# Patient Record
Sex: MALE | Race: WHITE
[De-identification: names, ages, dates, MRNs, and addresses within clinical notes are randomized per-mention and may not be internally consistent; named-entity substitution may affect disease eponyms.]

---

## 2023-07-24 ENCOUNTER — RX ONLY (RX ONLY)
Age: 58
End: 2023-07-24

## 2023-07-24 ENCOUNTER — OFFICE (OUTPATIENT)
Dept: URBAN - METROPOLITAN AREA CLINIC 109 | Facility: CLINIC | Age: 58
Setting detail: OPHTHALMOLOGY
End: 2023-07-24
Payer: COMMERCIAL

## 2023-07-24 DIAGNOSIS — H35.54: ICD-10-CM

## 2023-07-24 DIAGNOSIS — H43.393: ICD-10-CM

## 2023-07-24 DIAGNOSIS — H16.223: ICD-10-CM

## 2023-07-24 DIAGNOSIS — H25.13: ICD-10-CM

## 2023-07-24 PROCEDURE — 92014 COMPRE OPH EXAM EST PT 1/>: CPT | Performed by: OPHTHALMOLOGY

## 2023-07-24 PROCEDURE — 92250 FUNDUS PHOTOGRAPHY W/I&R: CPT | Performed by: OPHTHALMOLOGY

## 2023-07-24 ASSESSMENT — REFRACTION_AUTOREFRACTION
OD_SPHERE: -0.50
OS_SPHERE: 0.00
OS_CYLINDER: -0.50
OD_CYLINDER: -0.25
OD_AXIS: 003
OS_AXIS: 150

## 2023-07-24 ASSESSMENT — CONFRONTATIONAL VISUAL FIELD TEST (CVF)
OD_FINDINGS: FULL
OS_FINDINGS: FULL

## 2023-07-24 ASSESSMENT — SUPERFICIAL PUNCTATE KERATITIS (SPK)
OD_SPK: 1+
OS_SPK: 1+

## 2023-07-24 ASSESSMENT — SPHEQUIV_DERIVED
OS_SPHEQUIV: -0.25
OD_SPHEQUIV: -0.625

## 2023-07-24 ASSESSMENT — VISUAL ACUITY
OD_BCVA: 20/20-1
OS_BCVA: 20/20

## 2023-07-24 ASSESSMENT — TONOMETRY
OS_IOP_MMHG: 16
OD_IOP_MMHG: 16

## 2024-07-30 ENCOUNTER — OFFICE (OUTPATIENT)
Dept: URBAN - METROPOLITAN AREA CLINIC 109 | Facility: CLINIC | Age: 59
Setting detail: OPHTHALMOLOGY
End: 2024-07-30
Payer: COMMERCIAL

## 2024-07-30 DIAGNOSIS — H35.54: ICD-10-CM

## 2024-07-30 DIAGNOSIS — H43.393: ICD-10-CM

## 2024-07-30 DIAGNOSIS — H25.13: ICD-10-CM

## 2024-07-30 DIAGNOSIS — H00.015: ICD-10-CM

## 2024-07-30 DIAGNOSIS — H16.223: ICD-10-CM

## 2024-07-30 PROCEDURE — 92014 COMPRE OPH EXAM EST PT 1/>: CPT | Performed by: OPHTHALMOLOGY

## 2024-07-30 PROCEDURE — 92250 FUNDUS PHOTOGRAPHY W/I&R: CPT | Performed by: OPHTHALMOLOGY

## 2024-07-30 ASSESSMENT — CONFRONTATIONAL VISUAL FIELD TEST (CVF)
OS_FINDINGS: FULL
OD_FINDINGS: FULL

## 2024-11-24 ENCOUNTER — INPATIENT (INPATIENT)
Facility: HOSPITAL | Age: 59
LOS: 1 days | Discharge: HOME | DRG: 305 | End: 2024-11-26
Attending: STUDENT IN AN ORGANIZED HEALTH CARE EDUCATION/TRAINING PROGRAM | Admitting: INTERNAL MEDICINE
Payer: COMMERCIAL

## 2024-11-24 VITALS
HEART RATE: 83 BPM | RESPIRATION RATE: 18 BRPM | TEMPERATURE: 98 F | SYSTOLIC BLOOD PRESSURE: 236 MMHG | HEIGHT: 74 IN | DIASTOLIC BLOOD PRESSURE: 134 MMHG | OXYGEN SATURATION: 98 % | WEIGHT: 240.08 LBS

## 2024-11-24 PROCEDURE — 99285 EMERGENCY DEPT VISIT HI MDM: CPT

## 2024-11-24 PROCEDURE — 93010 ELECTROCARDIOGRAM REPORT: CPT

## 2024-11-24 NOTE — ED ADULT TRIAGE NOTE - CHIEF COMPLAINT QUOTE
constipation, last bowel movement was Thursday.  pt hypertensive with feeling of being light headed and bilateral weak legs

## 2024-11-25 DIAGNOSIS — I16.0 HYPERTENSIVE URGENCY: ICD-10-CM

## 2024-11-25 DIAGNOSIS — I72.8 ANEURYSM OF OTHER SPECIFIED ARTERIES: ICD-10-CM

## 2024-11-25 DIAGNOSIS — Z29.9 ENCOUNTER FOR PROPHYLACTIC MEASURES, UNSPECIFIED: ICD-10-CM

## 2024-11-25 DIAGNOSIS — K59.00 CONSTIPATION, UNSPECIFIED: ICD-10-CM

## 2024-11-25 PROBLEM — N20.0 CALCULUS OF KIDNEY: Chronic | Status: ACTIVE | Noted: 2017-03-11

## 2024-11-25 LAB
ALBUMIN SERPL ELPH-MCNC: 4.1 G/DL — SIGNIFICANT CHANGE UP (ref 3.3–5)
ALP SERPL-CCNC: 72 U/L — SIGNIFICANT CHANGE UP (ref 40–120)
ALT FLD-CCNC: 38 U/L — SIGNIFICANT CHANGE UP (ref 12–78)
ANION GAP SERPL CALC-SCNC: 6 MMOL/L — SIGNIFICANT CHANGE UP (ref 5–17)
AST SERPL-CCNC: 20 U/L — SIGNIFICANT CHANGE UP (ref 15–37)
BASOPHILS # BLD AUTO: 0.04 K/UL — SIGNIFICANT CHANGE UP (ref 0–0.2)
BASOPHILS NFR BLD AUTO: 0.4 % — SIGNIFICANT CHANGE UP (ref 0–2)
BILIRUB SERPL-MCNC: 0.9 MG/DL — SIGNIFICANT CHANGE UP (ref 0.2–1.2)
BUN SERPL-MCNC: 20 MG/DL — SIGNIFICANT CHANGE UP (ref 7–23)
CALCIUM SERPL-MCNC: 9.1 MG/DL — SIGNIFICANT CHANGE UP (ref 8.5–10.1)
CHLORIDE SERPL-SCNC: 108 MMOL/L — SIGNIFICANT CHANGE UP (ref 96–108)
CK MB CFR SERPL CALC: 2.1 NG/ML — SIGNIFICANT CHANGE UP (ref 0–3.6)
CO2 SERPL-SCNC: 28 MMOL/L — SIGNIFICANT CHANGE UP (ref 22–31)
CREAT SERPL-MCNC: 0.84 MG/DL — SIGNIFICANT CHANGE UP (ref 0.5–1.3)
EGFR: 99 ML/MIN/1.73M2 — SIGNIFICANT CHANGE UP
EOSINOPHIL # BLD AUTO: 0.11 K/UL — SIGNIFICANT CHANGE UP (ref 0–0.5)
EOSINOPHIL NFR BLD AUTO: 1.2 % — SIGNIFICANT CHANGE UP (ref 0–6)
GLUCOSE SERPL-MCNC: 92 MG/DL — SIGNIFICANT CHANGE UP (ref 70–99)
HCT VFR BLD CALC: 45.6 % — SIGNIFICANT CHANGE UP (ref 39–50)
HGB BLD-MCNC: 15.4 G/DL — SIGNIFICANT CHANGE UP (ref 13–17)
IMM GRANULOCYTES NFR BLD AUTO: 0.1 % — SIGNIFICANT CHANGE UP (ref 0–0.9)
LIDOCAIN IGE QN: 26 U/L — SIGNIFICANT CHANGE UP (ref 13–75)
LYMPHOCYTES # BLD AUTO: 2.31 K/UL — SIGNIFICANT CHANGE UP (ref 1–3.3)
LYMPHOCYTES # BLD AUTO: 24.9 % — SIGNIFICANT CHANGE UP (ref 13–44)
MCHC RBC-ENTMCNC: 28.8 PG — SIGNIFICANT CHANGE UP (ref 27–34)
MCHC RBC-ENTMCNC: 33.8 G/DL — SIGNIFICANT CHANGE UP (ref 32–36)
MCV RBC AUTO: 85.2 FL — SIGNIFICANT CHANGE UP (ref 80–100)
MONOCYTES # BLD AUTO: 0.67 K/UL — SIGNIFICANT CHANGE UP (ref 0–0.9)
MONOCYTES NFR BLD AUTO: 7.2 % — SIGNIFICANT CHANGE UP (ref 2–14)
NEUTROPHILS # BLD AUTO: 6.12 K/UL — SIGNIFICANT CHANGE UP (ref 1.8–7.4)
NEUTROPHILS NFR BLD AUTO: 66.2 % — SIGNIFICANT CHANGE UP (ref 43–77)
NRBC # BLD: 0 /100 WBCS — SIGNIFICANT CHANGE UP (ref 0–0)
PLATELET # BLD AUTO: 234 K/UL — SIGNIFICANT CHANGE UP (ref 150–400)
POTASSIUM SERPL-MCNC: 3.5 MMOL/L — SIGNIFICANT CHANGE UP (ref 3.5–5.3)
POTASSIUM SERPL-SCNC: 3.5 MMOL/L — SIGNIFICANT CHANGE UP (ref 3.5–5.3)
PROT SERPL-MCNC: 7.5 G/DL — SIGNIFICANT CHANGE UP (ref 6–8.3)
RBC # BLD: 5.35 M/UL — SIGNIFICANT CHANGE UP (ref 4.2–5.8)
RBC # FLD: 12.5 % — SIGNIFICANT CHANGE UP (ref 10.3–14.5)
SODIUM SERPL-SCNC: 142 MMOL/L — SIGNIFICANT CHANGE UP (ref 135–145)
TROPONIN I, HIGH SENSITIVITY RESULT: 11.8 NG/L — SIGNIFICANT CHANGE UP
TROPONIN I, HIGH SENSITIVITY RESULT: 7.7 NG/L — SIGNIFICANT CHANGE UP
WBC # BLD: 9.26 K/UL — SIGNIFICANT CHANGE UP (ref 3.8–10.5)
WBC # FLD AUTO: 9.26 K/UL — SIGNIFICANT CHANGE UP (ref 3.8–10.5)

## 2024-11-25 PROCEDURE — 99222 1ST HOSP IP/OBS MODERATE 55: CPT | Mod: GC

## 2024-11-25 PROCEDURE — 74174 CTA ABD&PLVS W/CONTRAST: CPT | Mod: 26,MC

## 2024-11-25 PROCEDURE — 71275 CT ANGIOGRAPHY CHEST: CPT | Mod: 26,MC

## 2024-11-25 PROCEDURE — 93010 ELECTROCARDIOGRAM REPORT: CPT

## 2024-11-25 PROCEDURE — 71045 X-RAY EXAM CHEST 1 VIEW: CPT | Mod: 26

## 2024-11-25 RX ORDER — ACETAMINOPHEN 500MG 500 MG/1
650 TABLET, COATED ORAL EVERY 6 HOURS
Refills: 0 | Status: DISCONTINUED | OUTPATIENT
Start: 2024-11-25 | End: 2024-11-26

## 2024-11-25 RX ORDER — LABETALOL 100 MG/1
10 TABLET, FILM COATED ORAL ONCE
Refills: 0 | Status: COMPLETED | OUTPATIENT
Start: 2024-11-25 | End: 2024-11-25

## 2024-11-25 RX ORDER — SODIUM CHLORIDE 9 MG/ML
1000 INJECTION, SOLUTION INTRAMUSCULAR; INTRAVENOUS; SUBCUTANEOUS ONCE
Refills: 0 | Status: COMPLETED | OUTPATIENT
Start: 2024-11-25 | End: 2024-11-25

## 2024-11-25 RX ORDER — MECLIZINE HCL 12.5 MG
25 TABLET ORAL THREE TIMES A DAY
Refills: 0 | Status: DISCONTINUED | OUTPATIENT
Start: 2024-11-25 | End: 2024-11-26

## 2024-11-25 RX ORDER — HYDRALAZINE HYDROCHLORIDE 10 MG/1
10 TABLET ORAL ONCE
Refills: 0 | Status: COMPLETED | OUTPATIENT
Start: 2024-11-25 | End: 2024-11-25

## 2024-11-25 RX ORDER — LOSARTAN POTASSIUM 100 MG/1
50 TABLET, FILM COATED ORAL DAILY
Refills: 0 | Status: DISCONTINUED | OUTPATIENT
Start: 2024-11-25 | End: 2024-11-26

## 2024-11-25 RX ORDER — LOSARTAN POTASSIUM AND HYDROCHLOROTHIAZIDE 12.5; 5 MG/1; MG/1
1 TABLET ORAL
Qty: 30 | Refills: 0
Start: 2024-11-25 | End: 2024-12-24

## 2024-11-25 RX ORDER — POLYETHYLENE GLYCOL 3350 17 G/17G
17 POWDER, FOR SOLUTION ORAL ONCE
Refills: 0 | Status: COMPLETED | OUTPATIENT
Start: 2024-11-25 | End: 2024-11-26

## 2024-11-25 RX ORDER — ENOXAPARIN SODIUM 30 MG/.3ML
40 INJECTION SUBCUTANEOUS EVERY 24 HOURS
Refills: 0 | Status: DISCONTINUED | OUTPATIENT
Start: 2024-11-25 | End: 2024-11-26

## 2024-11-25 RX ORDER — MAGNESIUM CITRATE
296 SOLUTION, ORAL ORAL ONCE
Refills: 0 | Status: COMPLETED | OUTPATIENT
Start: 2024-11-25 | End: 2024-11-25

## 2024-11-25 RX ORDER — ACETAMINOPHEN 500MG 500 MG/1
1000 TABLET, COATED ORAL ONCE
Refills: 0 | Status: COMPLETED | OUTPATIENT
Start: 2024-11-25 | End: 2024-11-25

## 2024-11-25 RX ORDER — SENNOSIDES 8.6 MG
2 TABLET ORAL AT BEDTIME
Refills: 0 | Status: DISCONTINUED | OUTPATIENT
Start: 2024-11-25 | End: 2024-11-26

## 2024-11-25 RX ADMIN — HYDRALAZINE HYDROCHLORIDE 10 MILLIGRAM(S): 10 TABLET ORAL at 04:48

## 2024-11-25 RX ADMIN — LOSARTAN POTASSIUM 50 MILLIGRAM(S): 100 TABLET, FILM COATED ORAL at 15:17

## 2024-11-25 RX ADMIN — ENOXAPARIN SODIUM 40 MILLIGRAM(S): 30 INJECTION SUBCUTANEOUS at 21:17

## 2024-11-25 RX ADMIN — HYDRALAZINE HYDROCHLORIDE 10 MILLIGRAM(S): 10 TABLET ORAL at 00:35

## 2024-11-25 RX ADMIN — ACETAMINOPHEN 500MG 400 MILLIGRAM(S): 500 TABLET, COATED ORAL at 00:35

## 2024-11-25 RX ADMIN — Medication 2 TABLET(S): at 21:17

## 2024-11-25 RX ADMIN — SODIUM CHLORIDE 1000 MILLILITER(S): 9 INJECTION, SOLUTION INTRAMUSCULAR; INTRAVENOUS; SUBCUTANEOUS at 01:45

## 2024-11-25 RX ADMIN — Medication 296 MILLILITER(S): at 08:23

## 2024-11-25 RX ADMIN — LABETALOL 10 MILLIGRAM(S): 100 TABLET, FILM COATED ORAL at 08:00

## 2024-11-25 RX ADMIN — Medication 81 MILLIGRAM(S): at 15:17

## 2024-11-25 NOTE — PHYSICAL THERAPY INITIAL EVALUATION ADULT - ADDITIONAL COMMENTS
Pt lives with his spouse in a private house, 3 COTY + flight. Patient reports being independent in ADLs and ambulation prior to admission.

## 2024-11-25 NOTE — H&P ADULT - PROBLEM SELECTOR PLAN 1
Cardiology Dr. Lechuga consulted, recommending asa 81 and losartan 50mg qd  - s/p multiple rounds of hydralazine, and labetalol IV  - BP still mildly elevated, will cont to follow closely, and titrate anti-hypertensives as necessary  - monitor on tele, f/u repeat orthostatics in am; s/p IVF bolus as pt had BUN/Cr ratio greater than 20 suggestive of dehydration  - f/u 2d echo  - f/u lipid profile, A1c, TFTs

## 2024-11-25 NOTE — H&P ADULT - NSHPLABSRESULTS_GEN_ALL_CORE
15.4   9.26  )-----------( 234      ( 25 Nov 2024 00:25 )             45.6     11-25    142  |  108  |  20  ----------------------------<  92  3.5   |  28  |  0.84    Ca    9.1      25 Nov 2024 00:25    TPro  7.5  /  Alb  4.1  /  TBili  0.9  /  DBili  x   /  AST  20  /  ALT  38  /  AlkPhos  72  11-25    CT angio chest/abdomen/pelvis  IMPRESSION:  No aortic dissection or aneurysm. 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 Slight infiltrative changes of   the fat adjacent to the celiac axis. Recommend vascular consult.

## 2024-11-25 NOTE — H&P ADULT - PROBLEM SELECTOR PLAN 3
Pt c/o not having had bm  - started on senna, and dosed miralax  - may need further miralax, and addition fo dulcolax if pt continues ot not have bm

## 2024-11-25 NOTE — PHYSICAL THERAPY INITIAL EVALUATION ADULT - PATIENT PROFILE REVIEW, REHAB EVAL
PT orders received: ambulate with assistance. Consult with RN Dayanara, pt may participate in PT evaluation./yes

## 2024-11-25 NOTE — PHYSICAL THERAPY INITIAL EVALUATION ADULT - PERTINENT HX OF CURRENT PROBLEM, REHAB EVAL
Per H&P: "58 y/o m w/ no known PMH p/w abdominal pain symptoms that he felt was related to constipation.  Pt had not had bm in last few days.  He had some generalized abdominal discomfort, nonradiating, not relieved by anything, that he had not experienced before, prompting him to come to the ED.  He had no nausea or vomiting, and doesn't recall any major new dietary changes.      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  He was given rounds of IV hydralazine and IV labetalol."

## 2024-11-25 NOTE — ED PROVIDER NOTE - CLINICAL SUMMARY MEDICAL DECISION MAKING FREE TEXT BOX
constipation, last bowel movement was Thursday.  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

## 2024-11-25 NOTE — ED ADULT NURSE REASSESSMENT NOTE - NS ED NURSE REASSESS COMMENT FT1
1045: pt was in process of being discharged, felt dizzy while standing, assisted pt back to bed, lightheaded feeling persisted for pt.. pt currently denies CP/palpitations, SOB, blurry vision, N/V/D.... Dr Ragland informed, orthostatic BP's performed with positive results, pt admitted to telemetry.  1110: pt ambulatory with assistance to urinate, denies dizziness, CP/palpitations, SOB, blurry vision, N/V/D at this moment. respirations even/unlabored, no acute distress noted. will continue to monitor.
0720: report received from previous shift RN. pt a&o x3, laying in stretcher, verbally denies any pain or new/worsening complaints. pt noted with persistent HTN, Dr Ragland informed, pending new orders.. pt currently denies CP/palpitations, SOB, dizziness, blurry vision, N/V/D... respirations even/unlabored, no acute distress noted. comfort/safety maintained. will continue to monitor.

## 2024-11-25 NOTE — ED PROVIDER NOTE - CARE PLAN
1 Principal Discharge DX:	Abdominal pain  Secondary Diagnosis:	HTN (hypertension)   Principal Discharge DX:	Hypertensive urgency  Secondary Diagnosis:	HTN (hypertension)  Secondary Diagnosis:	Constipation   Principal Discharge DX:	Hypertensive urgency  Secondary Diagnosis:	Abdominal pain

## 2024-11-25 NOTE — ED PROVIDER NOTE - PATIENT PORTAL LINK FT
You can access the FollowMyHealth Patient Portal offered by [facility] by registering at the following website: [email or web address]. By joining [facility]’s FollowMyHealth portal, you will also be able to view your health information using other applications (apps) compatible with our system.

## 2024-11-25 NOTE — H&P ADULT - NSHPPHYSICALEXAM_GEN_ALL_CORE
VITAL SIGNS:    Vital Signs Last 24 Hrs  T(C): 36.7 (25 Nov 2024 09:40), Max: 37.2 (25 Nov 2024 06:30)  T(F): 98.1 (25 Nov 2024 09:40), Max: 98.9 (25 Nov 2024 06:30)  HR: 85 (25 Nov 2024 09:40) (67 - 92)  BP: 174/88 (25 Nov 2024 09:40) (174/88 - 236/134)  BP(mean): --  RR: 16 (25 Nov 2024 09:40) (16 - 18)  SpO2: 97% (25 Nov 2024 09:40) (97% - 98%)    Parameters below as of 25 Nov 2024 09:40  Patient On (Oxygen Delivery Method): room air        PHYSICAL EXAM:     GENERAL: no acute distress  HEENT: NC/AT, EOMI, neck supple, MMM  RESPIRATORY: LCTAB/L, no rhonchi, rales, or wheezing  CARDIOVASCULAR: RRR, no murmurs, gallops, rubs  ABDOMINAL: soft, non-tender, non-distended, positive bowel sounds   EXTREMITIES: no clubbing, cyanosis, or edema  NEUROLOGICAL: alert and oriented x 3, non-focal  SKIN: no rashes or lesions   MUSCULOSKELETAL: no gross joint deformity

## 2024-11-25 NOTE — CARE COORDINATION ASSESSMENT. - OTHER PERTINENT REFERRAL INFORMATION
Sw met with Pt at bedside. Pt is A &  O x4 and able to make his needs known. Sw introduced self and role and pt expressed understanding. Pt lives in a PVt home with spouse and he has 3STE and 1 flight to 2nd floor and 1 flight to basement. The pt states that he is Indep at home and no hx of HC, DME or ALMAZ. Pt has not seen a provider in years for checks ups and was admitted for HTN emergency. Pt asked questions about filling out a DNR but is not sure and he was given a copy of a MOLST to review and see if he would like to cont with that. Pt plans to return home upon DC.

## 2024-11-25 NOTE — ED PROVIDER NOTE - OBJECTIVE STATEMENT
constipation, last bowel movement was Thursday.  pt hypertensive with feeling of being light headed and bilateral weak legs  hypertension, constipation 59-year-old male with history of kidney stones coming in for constipation for the last 4 days.  Patient states he did have a bowel movement with small tiera and has been passing gas.  Patient denies any nausea vomiting fever chills chest pain shortness of breath.  Patient states he did feel lightheaded earlier and felt like his legs were weak.  Patient denies any abdominal surgical history denies taking anything for for the constipation.  Patient denies any rectal pressure or pain.  But is complaining of some abdominal pain.  Patient noted to have elevated blood pressure in ED states he has not seen a doctor for years.

## 2024-11-25 NOTE — H&P ADULT - PROBLEM SELECTOR PLAN 2
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

## 2024-11-25 NOTE — PATIENT PROFILE ADULT - FALL HARM RISK - RISK INTERVENTIONS

## 2024-11-25 NOTE — CONSULT NOTE ADULT - NS ATTEND AMEND GEN_ALL_CORE FT
60 y/o M PMH of kidney stones who was found to be in hypertensive urgency. Patient is not showing any laboratory or physical signs of end organ dysfunction    - BP systolic 180s-200s  - Patient not on any home medications; has not seen physician in years  - would start losartan/hctz 50/12.5 QD.  Needs to follow with me in the office for echo and medication titration

## 2024-11-25 NOTE — H&P ADULT - ASSESSMENT
60 y/o m w/ n known PMH p/w abdominal pain symptoms, found to have hypertensive urgency, and aneurysmal dilation of celiac artery

## 2024-11-25 NOTE — H&P ADULT - HISTORY OF PRESENT ILLNESS
58 y/o m w/ n known PMH p/w abdominal pain symptoms that he felt was related to constipation.   58 y/o m w/ no known PMH p/w abdominal pain symptoms that he felt was related to constipation.  Pt had not had bm in last few days.  He had some generalized abdominal discomfort, nonradiating, not relieved by anything, that he had not experienced before, prompting him to come to the ED.  He had no nausea or vomiting, and doesn't recall any major new dietary changes.      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  He was given rounds of IV hydralazine and IV labetalol.

## 2024-11-25 NOTE — H&P ADULT - NSHPREVIEWOFSYSTEMS_GEN_ALL_CORE
CONSTITUTIONAL: + Weakness; No fevers or chills  EYES/ENT: No visual changes, no throat pain   RESPIRATORY: No cough, wheezing, hemoptysis; No shortness of breath  CARDIOVASCULAR: No chest pain or palpitations  GASTROINTESTINAL: + Abdominal Pain; No diarrhea or constipation. No melena or hematochezia.  GENITOURINARY: No dysuria, frequency or hematuria  NEUROLOGICAL: No dizziness, numbness, or weakness  SKIN: No itching, burning, rashes, or lesions   All other review of systems is negative unless indicated above.

## 2024-11-25 NOTE — ED PROVIDER NOTE - NS ED ATTENDING STATEMENT MOD
Attending Only This was a shared visit with the FRNAKLYN. I reviewed and verified the documentation.

## 2024-11-25 NOTE — CONSULT NOTE ADULT - SUBJECTIVE AND OBJECTIVE BOX
Vascular Attending:  Dr Mtz      HPI: 59-year-old male with history of kidney stones coming in for constipation for the last 4 days.  Patient reports he has been experiencing cramping pain in mid epigastric marea for past 2 weeks particularly after eat states he did have a bowel movement with small tiera and has been passing gas.  Patient denies any nausea vomiting fever chills chest pain shortness of breath.  Patient states he did feel lightheaded earlier and felt like his legs were weak.  Patient denies any abdominal surgical history denies taking anything for for the constipation.  Patient denies any rectal pressure or pain.  But is complaining of some abdominal pain.  Patient noted to have elevated blood pressure in ED       PAST MEDICAL & SURGICAL HISTORY:  Kidney stones      No significant past surgical history          REVIEW OF SYSTEMS      General:	    Skin/Breast:  	  Ophthalmologic:  	  ENMT:	    Respiratory and Thorax:  	  Cardiovascular:	    Gastrointestinal:	    Genitourinary:	    Musculoskeletal:	    Neurological:	    Psychiatric:	    Hematology/Lymphatics:	    Endocrine:	    Allergic/Immunologic:	    MEDICATIONS  (STANDING):    MEDICATIONS  (PRN):      Allergies    No Known Allergies    Intolerances        SOCIAL HISTORY:      Vital Signs Last 24 Hrs  T(C): 37.2 (25 Nov 2024 06:30), Max: 37.2 (25 Nov 2024 06:30)  T(F): 98.9 (25 Nov 2024 06:30), Max: 98.9 (25 Nov 2024 06:30)  HR: 88 (25 Nov 2024 06:30) (67 - 92)  BP: 192/91 (25 Nov 2024 06:30) (182/88 - 236/134)  BP(mean): --  RR: 18 (25 Nov 2024 06:30) (18 - 18)  SpO2: 97% (25 Nov 2024 06:30) (97% - 98%)    Parameters below as of 25 Nov 2024 06:30  Patient On (Oxygen Delivery Method): room air        PHYSICAL EXAM:      Constitutional:    Eyes:    ENMT:    Neck:    Breasts:    Back:    Respiratory:    Cardiovascular:    Gastrointestinal:    Genitourinary:    Rectal:    Extremities:    Vascular:    Neurological:    Skin:    Lymph Nodes:    Musculoskeletal:    Psychiatric:      Pulses:   Right:                                                                          Left:  FEM [ ]2+ [ ]1+ [ ]doppler                                             FEM [ ]2+ [ ]1+ [ ]doppler    POP [ ]2+ [ ]1+ [ ]doppler                                             POP [ ]2+ [ ]1+ [ ]doppler    DP [ ]2+ [ ]1+ [ ]doppler                                                DP [ ]2+ [ ]1+ [ ]doppler  PT[ ]2+ [ ]1+ [ ]doppler                                                  PT [ ]2+ [ ]1+ [ ]doppler      LABS:                        15.4   9.26  )-----------( 234      ( 25 Nov 2024 00:25 )             45.6     11-25    142  |  108  |  20  ----------------------------<  92  3.5   |  28  |  0.84    Ca    9.1      25 Nov 2024 00:25    TPro  7.5  /  Alb  4.1  /  TBili  0.9  /  DBili  x   /  AST  20  /  ALT  38  /  AlkPhos  72  11-25      Urinalysis Basic - ( 25 Nov 2024 00:25 )    Color: x / Appearance: x / SG: x / pH: x  Gluc: 92 mg/dL / Ketone: x  / Bili: x / Urobili: x   Blood: x / Protein: x / Nitrite: x   Leuk Esterase: x / RBC: x / WBC x   Sq Epi: x / Non Sq Epi: x / Bacteria: x        RADIOLOGY & ADDITIONAL STUDIES    Impression and Plan: Vascular Attending:  Dr Mtz      HPI: 59-year-old male with history of kidney stones coming in for constipation for the last 4 days.  Patient reports he has been experiencing cramping pain in mid epigastric area for past 2 weeks particularly after eating. He denies any accompanying N/V and no bloody stools. He states he did have a bowel movement with small tiera yesterday and has been passing gas.  Patient denies any nausea vomiting fever chills chest pain shortness of breath.  Patient states he did feel lightheaded earlier and felt like his legs were weak.  Patient denies any abdominal surgical history denies taking anything for for the constipation.  Patient denies any rectal pressure or pain.  But is complaining of some abdominal pain.  Patient noted to have elevated blood pressure in ED       PAST MEDICAL & SURGICAL HISTORY:  Kidney stones      No significant past surgical history          REVIEW OF SYSTEMS-as above otherwise neagtive  General:	    Skin/Breast:  	  Ophthalmologic:  	  ENMT:	    Respiratory and Thorax:  	  Cardiovascular:	    Gastrointestinal:	    Genitourinary:	    Musculoskeletal:	    Neurological:	    Psychiatric:	    Hematology/Lymphatics:	    Endocrine:	    Allergic/Immunologic:	    MEDICATIONS  (STANDING):    MEDICATIONS  (PRN):      Allergies  No Known Allergies      SOCIAL HISTORY: ; non smoker; no ETOH or illicit drug use      Vital Signs Last 24 Hrs  T(C): 37.2 (25 Nov 2024 06:30), Max: 37.2 (25 Nov 2024 06:30)  T(F): 98.9 (25 Nov 2024 06:30), Max: 98.9 (25 Nov 2024 06:30)  HR: 88 (25 Nov 2024 06:30) (67 - 92)  BP: 192/91 (25 Nov 2024 06:30) (182/88 - 236/134)  BP(mean): --  RR: 18 (25 Nov 2024 06:30) (18 - 18)  SpO2: 97% (25 Nov 2024 06:30) (97% - 98%)    Parameters below as of 25 Nov 2024 06:30  Patient On (Oxygen Delivery Method): room air        PHYSICAL EXAM:  Constitutional: WD M in NAD  Eyes: PERRL, EOMI  ENMT: WNL  Neck: No JVD  Respiratory: CTA  Cardiovascular: normal S1, S2  Gastrointestinal: soft, ND, NT, + BS, no pulsatile masses  Extremities: No e/c/c.   Neurological: A&O x 3, LOPEZ X 4=  Psychiatric: Tearful and anxious  Pulses:   Right:                                                                             Left:  FEM [x ]2+ [ ]1+ [ ]doppler                                             FEM [x ]2+ [ ]1+ [ ]doppler    POP [x ]2+ [ ]1+ [ ]doppler                                             POP x[ ]2+ [ ]1+ [ ]doppler    DP [x ]2+ [ ]1+ [ ]doppler                                                DP [ x]2+ [ ]1+ [ ]doppler  PT[ ]2+ [x ]1+ [ ]doppler                                                  PT [x ]2+ [ ]1+ [ ]doppler      LABS:                        15.4   9.26  )-----------( 234      ( 25 Nov 2024 00:25 )             45.6     11-25    142  |  108  |  20  ----------------------------<  92  3.5   |  28  |  0.84    Ca    9.1      25 Nov 2024 00:25    TPro  7.5  /  Alb  4.1  /  TBili  0.9  /  DBili  x   /  AST  20  /  ALT  38  /  AlkPhos  72  11-25      Urinalysis Basic - ( 25 Nov 2024 00:25 )    Color: x / Appearance: x / SG: x / pH: x  Gluc: 92 mg/dL / Ketone: x  / Bili: x / Urobili: x   Blood: x / Protein: x / Nitrite: x   Leuk Esterase: x / RBC: x / WBC x   Sq Epi: x / Non Sq Epi: x / Bacteria: x        RADIOLOGY & ADDITIONAL STUDIES    < from: CT Angio Chest Aorta w/wo IV Cont (11.25.24 @ 01:27) >  ACC: [id number] EXAM:  CT ANGIO CHEST AORTA WAWIC   ORDERED BY: LAURA BARRETO     ACC: [id number] EXAM:  CT ANGIO ABD PELV (W)AW IC   ORDERED BY: LAURA BARRETO     PROCEDURE DATE:  11/25/2024          INTERPRETATION:  CLINICAL INFORMATION: Elevated left prior. Pain.    COMPARISON: CT the abdomen and pelvis from 3/11/2017.    CONTRAST/COMPLICATIONS:  IV Contrast: Omnipaque 350 (accession [id number]), IV contrast documented   in unlinked concurrent exam (accession [id number])  90 cc administered   10   cc discarded  Oral Contrast: NONE      PROCEDURE:  CT Angiography of the Chest, Abdomen and Pelvis.  Precontrast imaging was performed through the chest followed by arterial   phase imaging of the chest, abdomen and pelvis.  Sagittal and coronal reformats were performed as well as 3D (MIP)   reconstructions.    FINDINGS:  CHEST:  LUNGS AND LARGE AIRWAYS: Patent central airways. No lung consolidation,   abnormal groundglass opacity, suspicious nodule, or mass.  PLEURA: No pleural effusion.  VESSELS: Normal caliber thoracic aorta. Precontrast images show no   evidence of a thoracic aortic intramural hematoma. No aortic dissection.   Mild atherosclerotic calcifications of the thoracic aorta. Main pulmonary   artery is not dilated. No central pulmonaryembolism.  HEART: Heart size is normal. No pericardial effusion.  MEDIASTINUM AND LUZ: No lymphadenopathy.  CHEST WALL AND LOWER NECK: Within normal limits.    ABDOMEN AND PELVIS:  LIVER: Within normal limits.  BILE DUCTS: Normal caliber.  GALLBLADDER: Within normal limits.  SPLEEN: Within normal limits.  PANCREAS: Within normal limits.  ADRENALS: Within normal limits.  KIDNEYS/URETERS: Kidneys enhance symmetrically without hydronephrosis.   Nonobstructing left intrarenal calculus measuring 5 mm. Left renal cyst.   Subcentimeter low-attenuation lesion in the right kidney which is too   small to characterize.    BLADDER: Within normal limits.  REPRODUCTIVE ORGANS: Prostate gland is mildly enlarged.    BOWEL: No bowel obstruction. Appendix is normal. No overt bowel wall   thickening or mesenteric inflammatory change. Colonic diverticulosis   without evidence of diverticulitis.  PERITONEUM/RETROPERITONEUM: No ascites, pneumoperitoneum, or loculated   collection.  VESSELS: Abdominal aorta isnormal in caliber. No abdominal aortic   dissection flap. Mild atherosclerotic calcifications of the aortoiliac   tree. 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 Slight infiltrative changes of the fat adjacent to the celiac   axis. Minimal peripheral thrombus in the proximal SMA with mild luminal   narrowing.  LYMPH NODES: No lymphadenopathy.  ABDOMINAL WALL: Within normal limits.  BONES:Degenerative changes of the spine.    IMPRESSION:  No aortic dissection or aneurysm. 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 Slight infiltrative changes of   the fat adjacent to the celiac axis. Recommend vascular consult.          < end of copied text >

## 2024-11-25 NOTE — CONSULT NOTE ADULT - SUBJECTIVE AND OBJECTIVE BOX
Patient is a 59y old  Male who presents with a chief complaint of     HPI: 59-year-old male with history of kidney stones coming in for constipation for the last 4 days.  Patient states he did have a bowel movement with small tiera and has been passing gas.  Patient denies any nausea vomiting fever chills chest pain shortness of breath.  Patient states he did feel lightheaded earlier and felt like his legs were weak.  Patient denies any abdominal surgical history denies taking anything for for the constipation.  Patient denies any rectal pressure or pain.  But is complaining of some abdominal pain.  Patient noted to have elevated blood pressure in ED states he has not seen a doctor for years.    Interval hx: Patient presented ED with complaint of chronic constipation, was found to have hypertension with systolic between 180-200s. Patient is asymptomatic. Wife at bedside to offer interval history, says patient does not see a doctor is unsure of baseline BP; patient is asymptomatic has no history of CF, MI, CVA.   Trops negative x2 (7-11), normal Cr, normal LFTs. EKG nonischemic. Patient received 2 pushes of hydralazine in ED, BPO still in 190s; placed on labatelol.        PAST MEDICAL & SURGICAL HISTORY:  Kidney stones      No significant past surgical history        MEDICATIONS  (STANDING):  labetalol Injectable 10 milliGRAM(s) IV Push once  magnesium citrate Oral Solution 296 milliLiter(s) Oral once    MEDICATIONS  (PRN):      FAMILY HISTORY:  No pertinent family history in first degree relatives      Denies Family history of CAD or early MI    ROS:  Constitutional: denies fever, chills  HEENT: denies blurry vision, difficulty hearing  Respiratory: denies SOB, [place], cough, pleuritic discomfort when laying down   Cardiovascular: denies CP, palpitations, orthopnea, PND, LE edema  Gastrointestinal: denies nausea, vomiting, abdominal pain  Genitourinary: denies urinary changes  Skin: Denies rashes, itching  Neurologic: denies headache, weakness, dizziness  Hematology/Oncology: denies bleeding, easy bruising  ROS negative except as noted above      SOCIAL HISTORY:    No tobacco, Alcohol or Ddrug use    Vital Signs Last 24 Hrs  T(C): 37.2 (25 Nov 2024 06:30), Max: 37.2 (25 Nov 2024 06:30)  T(F): 98.9 (25 Nov 2024 06:30), Max: 98.9 (25 Nov 2024 06:30)  HR: 88 (25 Nov 2024 06:30) (67 - 92)  BP: 192/91 (25 Nov 2024 06:30) (182/88 - 236/134)  BP(mean): --  RR: 18 (25 Nov 2024 06:30) (18 - 18)  SpO2: 97% (25 Nov 2024 06:30) (97% - 98%)    Parameters below as of 25 Nov 2024 06:30  Patient On (Oxygen Delivery Method): room air        Physical Exam:  General: Well developed, well nourished, NAD  HEENT: NCAT, PERRLA, EOMI bl, moist mucous membranes   Neck:  nontender, no mass  Neurology: A&Ox3, nonfocal, sensation intact   Respiratory: CTA B/L, No W/R/R  CV: RRR, +S1/S2, no murmurs, rubs or gallops  Abdominal: Soft, NT, ND +BSx4, no palpable masses  Extremities: No C/C/E, + peripheral pulses  MSK: Normal ROM, no joint erythema or warmth, no joint swelling   Heme: No obvious ecchymosis or petechiae   Skin: warm, dry, normal color      ECG:    I&O's Detail      LABS:                        15.4   9.26  )-----------( 234      ( 25 Nov 2024 00:25 )             45.6     11-25    142  |  108  |  20  ----------------------------<  92  3.5   |  28  |  0.84    Ca    9.1      25 Nov 2024 00:25    TPro  7.5  /  Alb  4.1  /  TBili  0.9  /  DBili  x   /  AST  20  /  ALT  38  /  AlkPhos  72  11-25    CARDIAC MARKERS ( 25 Nov 2024 00:25 )  x     / x     / x     / x     / 2.1 ng/mL        Urinalysis Basic - ( 25 Nov 2024 00:25 )    Color: x / Appearance: x / SG: x / pH: x  Gluc: 92 mg/dL / Ketone: x  / Bili: x / Urobili: x   Blood: x / Protein: x / Nitrite: x   Leuk Esterase: x / RBC: x / WBC x   Sq Epi: x / Non Sq Epi: x / Bacteria: x      I&O's Summary    BNP  RADIOLOGY & ADDITIONAL STUDIES:    < from: CT Angio Abdomen and Pelvis w/ IV Cont (11.25.24 @ 01:27) >  FINDINGS:  CHEST:  LUNGS AND LARGE AIRWAYS: Patent central airways. No lung consolidation,   abnormal groundglass opacity, suspicious nodule, or mass.  PLEURA: No pleural effusion.  VESSELS: Normal caliber thoracic aorta. Precontrast images show no   evidence of a thoracic aortic intramural hematoma. No aortic dissection.   Mild atherosclerotic calcifications of the thoracic aorta. Main pulmonary   artery is not dilated. No central pulmonaryembolism.  HEART: Heart size is normal. No pericardial effusion.  MEDIASTINUM AND LUZ: No lymphadenopathy.  CHEST WALL AND LOWER NECK: Within normal limits.    ABDOMEN AND PELVIS:  LIVER: Within normal limits.  BILE DUCTS: Normal caliber.  GALLBLADDER: Within normal limits.  SPLEEN: Within normal limits.  PANCREAS: Within normal limits.  ADRENALS: Within normal limits.  KIDNEYS/URETERS: Kidneys enhance symmetrically without hydronephrosis.   Nonobstructing left intrarenal calculus measuring 5 mm. Left renal cyst.   Subcentimeter low-attenuation lesion in the right kidney which is too   small to characterize.    BLADDER: Within normal limits.  REPRODUCTIVE ORGANS: Prostate gland is mildly enlarged.    BOWEL: No bowel obstruction. Appendix is normal. No overt bowel wall   thickening or mesenteric inflammatory change. Colonic diverticulosis   without evidence of diverticulitis.  PERITONEUM/RETROPERITONEUM: No ascites, pneumoperitoneum, or loculated   collection.  VESSELS: Abdominal aorta isnormal in caliber. No abdominal aortic   dissection flap. Mild atherosclerotic calcifications of the aortoiliac   tree. 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 Slight infiltrative changes of the fat adjacent to the celiac   axis. Minimal peripheral thrombus in the proximal SMA with mild luminal   narrowing.  LYMPH NODES: No lymphadenopathy.  ABDOMINAL WALL: Within normal limits.  BONES:Degenerative changes of the spine.    IMPRESSION:  No aortic dissection or aneurysm. 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 Slight infiltrative changes of   the fat adjacent to the celiac axis. Recommend vascular consult.    < end of copied text >

## 2024-11-25 NOTE — CARE COORDINATION ASSESSMENT. - NSCAREPROVIDERS_GEN_ALL_CORE_FT
CARE PROVIDERS:  Accepting Physician: Keith Galo  Access Services: Ade Velasquez  Access Services: Ming Moran  Administration: Ju Lynne  Admitting: Keith Galo  Attending: Keith Galo  Consultant: Inna Smart  Consultant: Juan Antonio Lechuga  Consultant: Rochelle Rudd  Consultant: Marta Mtz  Covering Team: Jose Treviño  ED ACP: Arpit Cortez  ED Attending: Ki Song  ED Nurse: William Echeverria  Nurse: William Echeverria  Nurse: Rachel Hoffman  Ordered: ServiceAccount, [facility] HospitalMLM  Primary Team: Faustino Macias  Primary Team: Cinthya Ragland  Primary Team: Keith Galo  : Key Pappas// Supp. Assoc.: Nathan Cardoza

## 2024-11-25 NOTE — ED ADULT NURSE NOTE - OBJECTIVE STATEMENT
Pt presented to ED c/o constipation, last BM 11/21.  Upon triage pt noted to have elevated BP, denies any symptoms.  no pMhx.  No neuro deficits noted.  Denies headache.  No visual changes.  No chest pain or SOB.  No n/v/d.  EKG completed- NSR,  Maintain comfort.

## 2024-11-25 NOTE — ED PROVIDER NOTE - NSFOLLOWUPINSTRUCTIONS_ED_ALL_ED_FT
-- You should update your primary care physician on your Emergency Department visit and follow up with them.  If you do not have a physician or have difficulty following up, please call: [phone] to obtain a [facility] doctor or specialist who can provide follow up.    -- Return to the ER for worsening or persistent symptoms, and/or ANY NEW OR CONCERNING SYMPTOMS.

## 2024-11-25 NOTE — CONSULT NOTE ADULT - ASSESSMENT
60 y/o M presents to ED with c/o constipation and abd pain. CTA demonstrated small celiac artery aneurysm. Noted with severe HTN in ED  Recommend medical admission for HTN management  Once BP controlled would add ASA 81 mg qd  Consider statin therapy  Bowel regimen  No acute intervention for celiac aneurysm  Can follow up with vascular surgery as outpatient  Seen and discussed with Dr Mtz

## 2024-11-25 NOTE — CONSULT NOTE ADULT - ASSESSMENT
58 y/o M PMH of kidney stones who was found to be in hypertensive urgency. Patient is not showing any laboratory or physical signs of end organ dysfunction    #Acute on chronic CHF exacerbation   - No history of HF  - patient euvolemic on exam no signs or symptoms of volume overload  - CT Chest no signs of pleural effusion or congestion     #Ischemia   - No clear evidence of acute ischemia  - Troponin negative   - EKG: NSR  - No hx of CAD; would start patient on asa 81 once BP stable  - Continue to monitor for signs or symptoms of ischemia     #Arrhythmia  - EKG: NSR, unchanged from prior EKG  - Monitor and replete lytes, keep K>4, Mg>2.    #HTN  - BP systolic 180s-200s  - Patient not on any home medications; has not seen physician in years  - s/p hydralizine x2 in ED; placed on labetolol  - CT C/A/P: No aortic dissection or aneurysm. 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  - All other workup per primary team.   60 y/o M PMH of kidney stones who was found to be in hypertensive urgency. Patient is not showing any laboratory or physical signs of end organ dysfunction    #Acute on chronic CHF exacerbation   - No history of HF  - patient euvolemic on exam no signs or symptoms of volume overload  - CT Chest no signs of pleural effusion or congestion     #Ischemia   - No clear evidence of acute ischemia  - Troponin negative   - EKG: NSR  - No hx of CAD; would start patient on asa 81 once BP stable  - Continue to monitor for signs or symptoms of ischemia     #Arrhythmia  - EKG: NSR, unchanged from prior EKG  - Monitor and replete lytes, keep K>4, Mg>2.    #HTN  - BP systolic 180s-200s  - Patient not on any home medications; has not seen physician in years  - s/p hydralizine x2 in ED; placed on labetolol  - CT C/A/P: No aortic dissection or aneurysm. 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  - All other workup per primary team.

## 2024-11-26 ENCOUNTER — TRANSCRIPTION ENCOUNTER (OUTPATIENT)
Age: 59
End: 2024-11-26

## 2024-11-26 ENCOUNTER — RESULT REVIEW (OUTPATIENT)
Age: 59
End: 2024-11-26

## 2024-11-26 VITALS
DIASTOLIC BLOOD PRESSURE: 95 MMHG | SYSTOLIC BLOOD PRESSURE: 168 MMHG | RESPIRATION RATE: 18 BRPM | TEMPERATURE: 98 F | HEART RATE: 93 BPM | OXYGEN SATURATION: 93 %

## 2024-11-26 LAB
A1C WITH ESTIMATED AVERAGE GLUCOSE RESULT: 5.3 % — SIGNIFICANT CHANGE UP (ref 4–5.6)
ALBUMIN SERPL ELPH-MCNC: 4.3 G/DL — SIGNIFICANT CHANGE UP (ref 3.3–5)
ALP SERPL-CCNC: 79 U/L — SIGNIFICANT CHANGE UP (ref 40–120)
ALT FLD-CCNC: 43 U/L — SIGNIFICANT CHANGE UP (ref 12–78)
ANION GAP SERPL CALC-SCNC: 8 MMOL/L — SIGNIFICANT CHANGE UP (ref 5–17)
AST SERPL-CCNC: 21 U/L — SIGNIFICANT CHANGE UP (ref 15–37)
BASOPHILS # BLD AUTO: 0.03 K/UL — SIGNIFICANT CHANGE UP (ref 0–0.2)
BASOPHILS NFR BLD AUTO: 0.2 % — SIGNIFICANT CHANGE UP (ref 0–2)
BILIRUB SERPL-MCNC: 0.6 MG/DL — SIGNIFICANT CHANGE UP (ref 0.2–1.2)
BUN SERPL-MCNC: 17 MG/DL — SIGNIFICANT CHANGE UP (ref 7–23)
CALCIUM SERPL-MCNC: 9.6 MG/DL — SIGNIFICANT CHANGE UP (ref 8.5–10.1)
CHLORIDE SERPL-SCNC: 105 MMOL/L — SIGNIFICANT CHANGE UP (ref 96–108)
CO2 SERPL-SCNC: 25 MMOL/L — SIGNIFICANT CHANGE UP (ref 22–31)
CREAT SERPL-MCNC: 0.76 MG/DL — SIGNIFICANT CHANGE UP (ref 0.5–1.3)
EGFR: 102 ML/MIN/1.73M2 — SIGNIFICANT CHANGE UP
EOSINOPHIL # BLD AUTO: 0.07 K/UL — SIGNIFICANT CHANGE UP (ref 0–0.5)
EOSINOPHIL NFR BLD AUTO: 0.6 % — SIGNIFICANT CHANGE UP (ref 0–6)
ESTIMATED AVERAGE GLUCOSE: 105 MG/DL — SIGNIFICANT CHANGE UP (ref 68–114)
GLUCOSE SERPL-MCNC: 104 MG/DL — HIGH (ref 70–99)
HCT VFR BLD CALC: 47.9 % — SIGNIFICANT CHANGE UP (ref 39–50)
HGB BLD-MCNC: 16.5 G/DL — SIGNIFICANT CHANGE UP (ref 13–17)
IMM GRANULOCYTES NFR BLD AUTO: 0.2 % — SIGNIFICANT CHANGE UP (ref 0–0.9)
LYMPHOCYTES # BLD AUTO: 1.53 K/UL — SIGNIFICANT CHANGE UP (ref 1–3.3)
LYMPHOCYTES # BLD AUTO: 12.6 % — LOW (ref 13–44)
MCHC RBC-ENTMCNC: 29 PG — SIGNIFICANT CHANGE UP (ref 27–34)
MCHC RBC-ENTMCNC: 34.4 G/DL — SIGNIFICANT CHANGE UP (ref 32–36)
MCV RBC AUTO: 84.2 FL — SIGNIFICANT CHANGE UP (ref 80–100)
MONOCYTES # BLD AUTO: 0.97 K/UL — HIGH (ref 0–0.9)
MONOCYTES NFR BLD AUTO: 8 % — SIGNIFICANT CHANGE UP (ref 2–14)
NEUTROPHILS # BLD AUTO: 9.56 K/UL — HIGH (ref 1.8–7.4)
NEUTROPHILS NFR BLD AUTO: 78.4 % — HIGH (ref 43–77)
NRBC # BLD: 0 /100 WBCS — SIGNIFICANT CHANGE UP (ref 0–0)
PLATELET # BLD AUTO: 273 K/UL — SIGNIFICANT CHANGE UP (ref 150–400)
POTASSIUM SERPL-MCNC: 3.7 MMOL/L — SIGNIFICANT CHANGE UP (ref 3.5–5.3)
POTASSIUM SERPL-SCNC: 3.7 MMOL/L — SIGNIFICANT CHANGE UP (ref 3.5–5.3)
PROT SERPL-MCNC: 7.7 G/DL — SIGNIFICANT CHANGE UP (ref 6–8.3)
RBC # BLD: 5.69 M/UL — SIGNIFICANT CHANGE UP (ref 4.2–5.8)
RBC # FLD: 12.9 % — SIGNIFICANT CHANGE UP (ref 10.3–14.5)
SODIUM SERPL-SCNC: 138 MMOL/L — SIGNIFICANT CHANGE UP (ref 135–145)
T3FREE SERPL-MCNC: 2.58 PG/ML — SIGNIFICANT CHANGE UP (ref 2–4.4)
T4 FREE SERPL-MCNC: 1.4 NG/DL — SIGNIFICANT CHANGE UP (ref 0.9–1.8)
TSH SERPL-MCNC: 1.08 UIU/ML — SIGNIFICANT CHANGE UP (ref 0.36–3.74)
WBC # BLD: 12.19 K/UL — HIGH (ref 3.8–10.5)
WBC # FLD AUTO: 12.19 K/UL — HIGH (ref 3.8–10.5)

## 2024-11-26 PROCEDURE — 71045 X-RAY EXAM CHEST 1 VIEW: CPT

## 2024-11-26 PROCEDURE — 84481 FREE ASSAY (FT-3): CPT

## 2024-11-26 PROCEDURE — 99233 SBSQ HOSP IP/OBS HIGH 50: CPT

## 2024-11-26 PROCEDURE — 97161 PT EVAL LOW COMPLEX 20 MIN: CPT

## 2024-11-26 PROCEDURE — 36415 COLL VENOUS BLD VENIPUNCTURE: CPT

## 2024-11-26 PROCEDURE — 74174 CTA ABD&PLVS W/CONTRAST: CPT | Mod: MC

## 2024-11-26 PROCEDURE — 80053 COMPREHEN METABOLIC PANEL: CPT

## 2024-11-26 PROCEDURE — 93005 ELECTROCARDIOGRAM TRACING: CPT

## 2024-11-26 PROCEDURE — 82553 CREATINE MB FRACTION: CPT

## 2024-11-26 PROCEDURE — 93306 TTE W/DOPPLER COMPLETE: CPT

## 2024-11-26 PROCEDURE — 93306 TTE W/DOPPLER COMPLETE: CPT | Mod: 26

## 2024-11-26 PROCEDURE — 83036 HEMOGLOBIN GLYCOSYLATED A1C: CPT

## 2024-11-26 PROCEDURE — 84443 ASSAY THYROID STIM HORMONE: CPT

## 2024-11-26 PROCEDURE — 80061 LIPID PANEL: CPT

## 2024-11-26 PROCEDURE — 99239 HOSP IP/OBS DSCHRG MGMT >30: CPT

## 2024-11-26 PROCEDURE — 85025 COMPLETE CBC W/AUTO DIFF WBC: CPT

## 2024-11-26 PROCEDURE — 96374 THER/PROPH/DIAG INJ IV PUSH: CPT

## 2024-11-26 PROCEDURE — 96375 TX/PRO/DX INJ NEW DRUG ADDON: CPT

## 2024-11-26 PROCEDURE — 99285 EMERGENCY DEPT VISIT HI MDM: CPT

## 2024-11-26 PROCEDURE — 84484 ASSAY OF TROPONIN QUANT: CPT

## 2024-11-26 PROCEDURE — 83690 ASSAY OF LIPASE: CPT

## 2024-11-26 PROCEDURE — 96376 TX/PRO/DX INJ SAME DRUG ADON: CPT

## 2024-11-26 PROCEDURE — 71275 CT ANGIOGRAPHY CHEST: CPT | Mod: MC

## 2024-11-26 PROCEDURE — 84439 ASSAY OF FREE THYROXINE: CPT

## 2024-11-26 RX ORDER — LOSARTAN POTASSIUM AND HYDROCHLOROTHIAZIDE 12.5; 5 MG/1; MG/1
1 TABLET ORAL
Qty: 30 | Refills: 0
Start: 2024-11-26 | End: 2024-12-25

## 2024-11-26 RX ADMIN — POLYETHYLENE GLYCOL 3350 17 GRAM(S): 17 POWDER, FOR SOLUTION ORAL at 05:23

## 2024-11-26 RX ADMIN — Medication 81 MILLIGRAM(S): at 11:17

## 2024-11-26 RX ADMIN — LOSARTAN POTASSIUM 50 MILLIGRAM(S): 100 TABLET, FILM COATED ORAL at 05:19

## 2024-11-26 NOTE — DISCHARGE NOTE PROVIDER - NSDCMRMEDTOKEN_GEN_ALL_CORE_FT
losartan-hydrochlorothiazide 50 mg-12.5 mg oral tablet: 1 tab(s) orally once a day   aspirin 81 mg oral tablet, chewable: 1 tab(s) orally once a day  losartan-hydrochlorothiazide 50 mg-12.5 mg oral tablet: 1 tab(s) orally once a day   aspirin 81 mg oral tablet, chewable: 1 tab(s) orally once a day  Hyzaar 100 mg-12.5 mg oral tablet: 1 tab(s) orally once a day

## 2024-11-26 NOTE — DISCHARGE NOTE NURSING/CASE MANAGEMENT/SOCIAL WORK - FINANCIAL ASSISTANCE
[facility] provides services at a reduced cost to those who are determined to be eligible through [facility]’s financial assistance program. Information regarding [facility]’s financial assistance program can be found by going to [email or web address] or by calling [phone].

## 2024-11-26 NOTE — DISCHARGE NOTE PROVIDER - PROVIDER TOKENS
PROVIDER:[TOKEN:[[id number]]] PROVIDER:[TOKEN:[[id number]]],PROVIDER:[TOKEN:[[id number]]]

## 2024-11-26 NOTE — DISCHARGE NOTE NURSING/CASE MANAGEMENT/SOCIAL WORK - PATIENT PORTAL LINK FT
You can access the FollowMyHealth Patient Portal offered by [facility] by registering at the following website: [email or web address]. By joining [facility]’s FollowMyHealth portal, you will also be able to view your health information using other applications (apps) compatible with our system.

## 2024-11-26 NOTE — PROGRESS NOTE ADULT - SUBJECTIVE AND OBJECTIVE BOX
[facility] -- Derek Do, Alexandrea, Stephy Yen, , Bowen Fairbanks  Office # [phone]    Follow Up:  Hypertensive Emergency    Subjective/Observations: Denies any dizziness or lightheadedness.  Denies any neuro symptom.  Comfortable on RA.  Denies any respiratory or cardiac discomfort.      REVIEW OF SYSTEMS: All other review of systems is negative unless indicated above  PAST MEDICAL & SURGICAL HISTORY:  Kidney stones    No significant past surgical history    MEDICATIONS  (STANDING):  aspirin  chewable 81 milliGRAM(s) Oral daily  enoxaparin Injectable 40 milliGRAM(s) SubCutaneous every 24 hours  hydrochlorothiazide 12.5 milliGRAM(s) Oral daily  losartan 50 milliGRAM(s) Oral daily  senna 2 Tablet(s) Oral at bedtime    MEDICATIONS  (PRN):  acetaminophen     Tablet .. 650 milliGRAM(s) Oral every 6 hours PRN Mild Pain (1 - 3)  acetaminophen     Tablet .. 650 milliGRAM(s) Oral every 6 hours PRN Temp greater or equal to 38C (100.4F)  meclizine 25 milliGRAM(s) Oral three times a day PRN Dizziness    Allergies    No Known Allergies    Intolerances    Vital Signs Last 24 Hrs  T(C): 36.5 (26 Nov 2024 11:00), Max: 37.3 (25 Nov 2024 21:45)  T(F): 97.7 (26 Nov 2024 11:00), Max: 99.2 (25 Nov 2024 21:45)  HR: 93 (26 Nov 2024 11:00) (73 - 93)  BP: 168/95 (26 Nov 2024 11:00) (151/88 - 174/87)  BP(mean): --  RR: 18 (26 Nov 2024 11:00) (17 - 20)  SpO2: 93% (26 Nov 2024 11:00) (93% - 97%)    Parameters below as of 26 Nov 2024 11:00  Patient On (Oxygen Delivery Method): room air    I&O's Summary    26 Nov 2024 07:01  -  26 Nov 2024 12:18  --------------------------------------------------------  IN: 200 mL / OUT: 0 mL / NET: 200 mL     PHYSICAL EXAM:  TELE: Not on tele  Constitutional: NAD, awake and alert  HEENT: Moist Mucous Membranes, Anicteric  Pulmonary: Non-labored, breath sounds are clear bilaterally, No wheezing, rales or rhonchi  Cardiovascular: Regular, S1 and S2, No murmurs, rubs, gallops or clicks  Gastrointestinal: Bowel Sounds present, soft, nontender.   Lymph: No peripheral edema. No lymphadenopathy.  Skin: No visible rashes or ulcers.  Psych:  Mood & affect appropriate  LABS: All Labs Reviewed:                        16.5   12.19 )-----------( 273      ( 26 Nov 2024 05:00 )             47.9                         15.4   9.26  )-----------( 234      ( 25 Nov 2024 00:25 )             45.6     26 Nov 2024 05:00    138    |  105    |  17     ----------------------------<  104    3.7     |  25     |  0.76   25 Nov 2024 00:25    142    |  108    |  20     ----------------------------<  92     3.5     |  28     |  0.84     Ca    9.6        26 Nov 2024 05:00  Ca    9.1        25 Nov 2024 00:25    TPro  7.7    /  Alb  4.3    /  TBili  0.6    /  DBili  x      /  AST  21     /  ALT  43     /  AlkPhos  79     26 Nov 2024 05:00  TPro  7.5    /  Alb  4.1    /  TBili  0.9    /  DBili  x      /  AST  20     /  ALT  38     /  AlkPhos  72     25 Nov 2024 00:25      CARDIAC MARKERS ( 25 Nov 2024 00:25 )  x     / x     / x     / x     / 2.1 ng/mL      12 Lead ECG:   Ventricular Rate 84 BPM    Atrial Rate 84 BPM    P-R Interval 154 ms    QRS Duration 92 ms    Q-T Interval 390 ms    QTC Calculation(Bazett) 460 ms    P Axis 37 degrees    R Axis -5 degrees    T Axis 25 degrees    Diagnosis Line Normal sinus rhythm  Normal ECG  Confirmed by JUAN ANTONIO FERRARO ([id number]) on 11/25/2024 11:55:01 AM (11-25-24 @ 06:32)      TRANSTHORACIC ECHOCARDIOGRAM REPORT  ________________________________________________________________________________                                      _______       Pt. Name:       ANKITA MURILLO Study Date:    11/26/2024  MRN:            [id number]        YOB: 1965  Accession #:    [id number]       Age:           59 years  Account#:       [id number]      Gender:        M  Heart Rate:                     Height:        74.02 in (188.00 cm)  Rhythm:                         Weight:        240.30 lb (109.00 kg)  Blood Pressure: 163/84 mmHg     BSA/BMI:       2.35 m² / 30.84 kg/m²  ________________________________________________________________________________________  Referring Physician:    Keith Galo  Interpreting Physician: Juan Antonio Ferraro MD  Primary Sonographer:    Pamela Jarrett RDCS    CPT:               ECHO TTE WO CON COMP W DOPP - 95244.m  Indication(s):     Other cerebrovascular disease - I67.89  Procedure:         Transthoracic echocardiogram with 2-D, M-mode and complete                     spectral and color flow Doppler.  Ordering [facility]  Admission Status:  Inpatient    _______________________________________________________________________________________     CONCLUSIONS:      1. Left ventricular systolic function is normal with an ejection fraction of 63 % by Calixto's method of disks.   2. There is normal LV mass and concentric remodeling.   3. Normal left ventricular diastolic function.   4. Normal right ventricular cavity size and normal right ventricular systolic function.   5. Trace mitral regurgitation.   6. Structurally normal mitral valve with normal leaflet excursion.   7. No evidence of aortic regurgitation.   8. Trileaflet aortic valve with normal systolic excursion.   9. Left atrium is normal in size.    ________________________________________________________________________________________  FINDINGS:     Left Ventricle:  Left ventricular systolic function is normal with a calculated ejection fraction of 63 % by the Calixto's biplane method of disks. Mild left ventricular hypertrophy. There is normal LV mass and concentric remodeling. There is normal left ventricular diastolic function.     Right Ventricle:  The right ventricular cavity is normal in size and right ventricular systolic function is normal.     Left Atrium:  The left atrium is normal in size.     Right Atrium:  The right atrium is normal in size.     Aortic Valve:  The aortic valve appears trileaflet with normal systolic excursion. There is no aortic valve stenosis. There is no evidence of aortic regurgitation.     Mitral Valve:  Structurally normal mitral valve with normal leaflet excursion. There is no mitral valve stenosis. There is trace mitral regurgitation.     Tricuspid Valve:  Structurally normal tricuspid valve with normal leaflet excursion. There is trace tricuspid regurgitation.     Pulmonic Valve:  Structurally normal pulmonic valve with normal leaflet excursion. There is trace pulmonic regurgitation.     Aorta:  The aortic root at the sinuses of Valsalva is normal in size. The aortic arch diameter is normal in size.     Pericardium:  No pericardial effusion seen.     Systemic Veins:  The inferior vena cava is normal in size (normal <2.1cm) with normal inspiratory collapse (normal >50%) consistent with normal right atrial pressure (~3, range 0-5mmHg).  ____________________________________________________________________  QUANTITATIVE DATA:  Left Ventricle Measurements: (Indexed to BSA)     IVSd (2D):   1.4 cm  LVPWd (2D):  1.3 cm  LVIDd (2D):  4.8 cm  LVIDs (2D):  2.8 cm  LV Mass:     261 g  111.0 g/m²  LV Vol d, MOD A2C: 98.6 ml  41.96 ml/m²  LV Vol d, MOD A4C: 119.0 ml 50.64 ml/m²  LV Vol d, MOD BP:  113.0 ml 48.09 ml/m²  LV Vol s, MOD A2C: 39.5 ml  16.81 ml/m²  LV Vol s, MOD A4C: 39.6 ml  16.85 ml/m²  LV Vol s, MOD BP:  41.6 ml  17.70 ml/m²  LVOT SV MOD BP:    71.4 ml  LV EF% MOD BP:     63 %     MV E Vmax:    0.89 m/s  MV A Vmax:    1.31 m/s  MV E/A:       0.68  e' lateral:   6.74 cm/s  e' medial:    6.20 cm/s  E/e' lateral: 13.26  E/e' medial:  14.42  E/e' Average: 13.82  MV DT:        264 msec    Aorta Measurements: (Normal range) (Indexed to BSA)     Ao Root d 3.50 cm (3.1 - 3.7 cm) 1.49 cm/m²  Ao Arch:  2.5 cm    Left Atrium Measurements: (Indexed to BSA)  LA Diam 2D: 3.70 cm    Right Ventricle Measurements: Right Atrial Measurements:     RV Base (RVID1):  4.4 cm      RA Vol s, MOD A4C  39.3 ml  RV Mid (RVID2):   3.8 cm      RA Area s, MOD A4C 15.0 cm²  RV Major (RVID3): 8.5 cm    Mitral Valve Measurements:     MV E Vmax: 0.9 m/s  MV A Vmax: 1.3 m/s  MV E/A:    0.7    Tricuspid Valve Measurements:     RA Pressure: 3 mmHg    ________________________________________________________________________________________  Electronically signed on 11/26/2024 at 10:10:00 AM by Juan Antonio Ferraro MD         *** Final ***      [facility] -- Derek Do, Alexandrea, Stephy Yen, , Bowen Fairbanks  Office # [phone]    Follow Up:  Hypertensive Emergency    Subjective/Observations: Denies any dizziness or lightheadedness.  Denies any neuro symptom.  Comfortable on RA.  Denies any respiratory or cardiac discomfort.      REVIEW OF SYSTEMS: All other review of systems is negative unless indicated above  PAST MEDICAL & SURGICAL HISTORY:  Kidney stones    No significant past surgical history    MEDICATIONS  (STANDING):  aspirin  chewable 81 milliGRAM(s) Oral daily  enoxaparin Injectable 40 milliGRAM(s) SubCutaneous every 24 hours  hydrochlorothiazide 12.5 milliGRAM(s) Oral daily  losartan 50 milliGRAM(s) Oral daily  senna 2 Tablet(s) Oral at bedtime    MEDICATIONS  (PRN):  acetaminophen     Tablet .. 650 milliGRAM(s) Oral every 6 hours PRN Mild Pain (1 - 3)  acetaminophen     Tablet .. 650 milliGRAM(s) Oral every 6 hours PRN Temp greater or equal to 38C (100.4F)  meclizine 25 milliGRAM(s) Oral three times a day PRN Dizziness    Allergies    No Known Allergies    Intolerances    Vital Signs Last 24 Hrs  T(C): 36.5 (26 Nov 2024 11:00), Max: 37.3 (25 Nov 2024 21:45)  T(F): 97.7 (26 Nov 2024 11:00), Max: 99.2 (25 Nov 2024 21:45)  HR: 93 (26 Nov 2024 11:00) (73 - 93)  BP: 168/95 (26 Nov 2024 11:00) (151/88 - 174/87)  BP(mean): --  RR: 18 (26 Nov 2024 11:00) (17 - 20)  SpO2: 93% (26 Nov 2024 11:00) (93% - 97%)    Parameters below as of 26 Nov 2024 11:00  Patient On (Oxygen Delivery Method): room air    I&O's Summary    26 Nov 2024 07:01  -  26 Nov 2024 12:18  --------------------------------------------------------  IN: 200 mL / OUT: 0 mL / NET: 200 mL     PHYSICAL EXAM:  TELE: Not on tele  Constitutional: NAD, awake and alert  HEENT: Moist Mucous Membranes, Anicteric  Pulmonary: Non-labored, breath sounds are clear bilaterally, No wheezing, rales or rhonchi  Cardiovascular: Regular, S1 and S2, No murmurs, rubs, gallops or clicks  Gastrointestinal: Bowel Sounds present, soft, nontender.   Lymph: No peripheral edema. No lymphadenopathy.  Skin: No visible rashes or ulcers.  Psych:  Mood & affect appropriate  LABS: All Labs Reviewed:                        16.5   12.19 )-----------( 273      ( 26 Nov 2024 05:00 )             47.9                         15.4   9.26  )-----------( 234      ( 25 Nov 2024 00:25 )             45.6     26 Nov 2024 05:00    138    |  105    |  17     ----------------------------<  104    3.7     |  25     |  0.76   25 Nov 2024 00:25    142    |  108    |  20     ----------------------------<  92     3.5     |  28     |  0.84     Ca    9.6        26 Nov 2024 05:00  Ca    9.1        25 Nov 2024 00:25    TPro  7.7    /  Alb  4.3    /  TBili  0.6    /  DBili  x      /  AST  21     /  ALT  43     /  AlkPhos  79     26 Nov 2024 05:00  TPro  7.5    /  Alb  4.1    /  TBili  0.9    /  DBili  x      /  AST  20     /  ALT  38     /  AlkPhos  72     25 Nov 2024 00:25      CARDIAC MARKERS ( 25 Nov 2024 00:25 )  x     / x     / x     / x     / 2.1 ng/mL      12 Lead ECG:   Ventricular Rate 84 BPM    Atrial Rate 84 BPM    P-R Interval 154 ms    QRS Duration 92 ms    Q-T Interval 390 ms    QTC Calculation(Bazett) 460 ms    P Axis 37 degrees    R Axis -5 degrees    T Axis 25 degrees    Diagnosis Line Normal sinus rhythm  Normal ECG  Confirmed by JUAN ANTONIO FERRARO ([id number]) on 11/25/2024 11:55:01 AM (11-25-24 @ 06:32)      TRANSTHORACIC ECHOCARDIOGRAM REPORT  ________________________________________________________________________________                                      _______       Pt. Name:       ANKITA MURILLO Study Date:    11/26/2024  MRN:            [id number]        YOB: 1965  Accession #:    [id number]       Age:           59 years  Account#:       [id number]      Gender:        M  Heart Rate:                     Height:        74.02 in (188.00 cm)  Rhythm:                         Weight:        240.30 lb (109.00 kg)  Blood Pressure: 163/84 mmHg     BSA/BMI:       2.35 m² / 30.84 kg/m²  ________________________________________________________________________________________  Referring Physician:    Keith Galo  Interpreting Physician: Juan Antonio Ferraro MD  Primary Sonographer:    Pamela Jarrett RDCS    CPT:               ECHO TTE WO CON COMP W DOPP - 69934.m  Indication(s):     Other cerebrovascular disease - I67.89  Procedure:         Transthoracic echocardiogram with 2-D, M-mode and complete                     spectral and color flow Doppler.  Ordering Location: [facility]  Admission Status:  Inpatient    _______________________________________________________________________________________     CONCLUSIONS:      1. Left ventricular systolic function is normal with an ejection fraction of 63 % by Calixto's method of disks.   2. There is normal LV mass and concentric remodeling.   3. Normal left ventricular diastolic function.   4. Normal right ventricular cavity size and normal right ventricular systolic function.   5. Trace mitral regurgitation.   6. Structurally normal mitral valve with normal leaflet excursion.   7. No evidence of aortic regurgitation.   8. Trileaflet aortic valve with normal systolic excursion.   9. Left atrium is normal in size.    ________________________________________________________________________________________  FINDINGS:     Left Ventricle:  Left ventricular systolic function is normal with a calculated ejection fraction of 63 % by the Calixto's biplane method of disks. Mild left ventricular hypertrophy. There is normal LV mass and concentric remodeling. There is normal left ventricular diastolic function.     Right Ventricle:  The right ventricular cavity is normal in size and right ventricular systolic function is normal.     Left Atrium:  The left atrium is normal in size.     Right Atrium:  The right atrium is normal in size.     Aortic Valve:  The aortic valve appears trileaflet with normal systolic excursion. There is no aortic valve stenosis. There is no evidence of aortic regurgitation.     Mitral Valve:  Structurally normal mitral valve with normal leaflet excursion. There is no mitral valve stenosis. There is trace mitral regurgitation.     Tricuspid Valve:  Structurally normal tricuspid valve with normal leaflet excursion. There is trace tricuspid regurgitation.     Pulmonic Valve:  Structurally normal pulmonic valve with normal leaflet excursion. There is trace pulmonic regurgitation.     Aorta:  The aortic root at the sinuses of Valsalva is normal in size. The aortic arch diameter is normal in size.     Pericardium:  No pericardial effusion seen.     Systemic Veins:  The inferior vena cava is normal in size (normal <2.1cm) with normal inspiratory collapse (normal >50%) consistent with normal right atrial pressure (~3, range 0-5mmHg).  ____________________________________________________________________  QUANTITATIVE DATA:  Left Ventricle Measurements: (Indexed to BSA)     IVSd (2D):   1.4 cm  LVPWd (2D):  1.3 cm  LVIDd (2D):  4.8 cm  LVIDs (2D):  2.8 cm  LV Mass:     261 g  111.0 g/m²  LV Vol d, MOD A2C: 98.6 ml  41.96 ml/m²  LV Vol d, MOD A4C: 119.0 ml 50.64 ml/m²  LV Vol d, MOD BP:  113.0 ml 48.09 ml/m²  LV Vol s, MOD A2C: 39.5 ml  16.81 ml/m²  LV Vol s, MOD A4C: 39.6 ml  16.85 ml/m²  LV Vol s, MOD BP:  41.6 ml  17.70 ml/m²  LVOT SV MOD BP:    71.4 ml  LV EF% MOD BP:     63 %     MV E Vmax:    0.89 m/s  MV A Vmax:    1.31 m/s  MV E/A:       0.68  e' lateral:   6.74 cm/s  e' medial:    6.20 cm/s  E/e' lateral: 13.26  E/e' medial:  14.42  E/e' Average: 13.82  MV DT:        264 msec    Aorta Measurements: (Normal range) (Indexed to BSA)     Ao Root d 3.50 cm (3.1 - 3.7 cm) 1.49 cm/m²  Ao Arch:  2.5 cm    Left Atrium Measurements: (Indexed to BSA)  LA Diam 2D: 3.70 cm    Right Ventricle Measurements: Right Atrial Measurements:     RV Base (RVID1):  4.4 cm      RA Vol s, MOD A4C  39.3 ml  RV Mid (RVID2):   3.8 cm      RA Area s, MOD A4C 15.0 cm²  RV Major (RVID3): 8.5 cm    Mitral Valve Measurements:     MV E Vmax: 0.9 m/s  MV A Vmax: 1.3 m/s  MV E/A:    0.7    Tricuspid Valve Measurements:     RA Pressure: 3 mmHg    ________________________________________________________________________________________  Electronically signed on 11/26/2024 at 10:10:00 AM by Juan Antonio Ferraro MD         *** Final ***

## 2024-11-26 NOTE — CASE MANAGEMENT PROGRESS NOTE - NSCMPROGRESSNOTE_GEN_ALL_CORE
CM advised patient to make a followup appointment with cardiologist while in patient. Pt. states his followup is with Dr. Coker tomorrow. CM referral to [facility] made. Pending acceptance. CM continues to follow.

## 2024-11-26 NOTE — DISCHARGE NOTE PROVIDER - HOSPITAL COURSE
FROM ADMISSION H+P:   HPI:  58 y/o m w/ no known PMH p/w abdominal pain symptoms that he felt was related to constipation.  Pt had not had bm in last few days.  He had some generalized abdominal discomfort, nonradiating, not relieved by anything, that he had not experienced before, prompting him to come to the ED.  He had no nausea or vomiting, and doesn't recall any major new dietary changes.      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  He was given rounds of IV hydralazine and IV labetalol. (25 Nov 2024 12:01)      ---  HOSPITAL COURSE/PERTINENT LABS/PROCEDURES PERFORMED/PENDING TESTS:    ---  PATIENT CONDITION:  - stable    ---  Vitals:    PHYSICAL EXAM ON DAY OF DISCHARGE: Patient was admitted for hypertensive urgency. Patient was seen and evaluated by vascular surgery and cardiology. 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     CT angiogram results chest/aorta/abdomen/pelvis:  FINDINGS:  CHEST:  LUNGS AND LARGE AIRWAYS: Patent central airways. No lung consolidation, abnormal groundglass opacity, suspicious nodule, or mass.  PLEURA: No pleural effusion.  VESSELS: Normal caliber thoracic aorta. Precontrast images show no evidence of a thoracic aortic intramural hematoma. No aortic dissection. Mild atherosclerotic calcifications of the thoracic aorta. Main pulmonary artery is not dilated. No central pulmonary embolism.  HEART: Heart size is normal. No pericardial effusion.  MEDIASTINUM AND LUZ: No lymphadenopathy.  CHEST WALL AND LOWER NECK: Within normal limits.    ABDOMEN AND PELVIS:  LIVER: Within normal limits.  BILE DUCTS: Normal caliber.  GALLBLADDER: Within normal limits.  SPLEEN: Within normal limits.  PANCREAS: Within normal limits.  ADRENALS: Within normal limits.  KIDNEYS/URETERS: Kidneys enhance symmetrically without hydronephrosis. Nonobstructing left intrarenal calculus measuring 5 mm. Left renal cyst. Subcentimeter low-attenuation lesion in the right kidney which is too small to characterize.    BLADDER: Within normal limits.  REPRODUCTIVE ORGANS: Prostate gland is mildly enlarged.    BOWEL: No bowel obstruction. Appendix is normal. No overt bowel wall thickening or mesenteric inflammatory change. Colonic diverticulosis without evidence of diverticulitis.  PERITONEUM/RETROPERITONEUM: No ascites, pneumoperitoneum, or loculated collection.  VESSELS: Abdominal aorta is normal in caliber. No abdominal aortic dissection flap. Mild atherosclerotic calcifications of theaortoiliac tree. 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 Slight infiltrative changes of the fat adjacent to the celiac axis. Minimal peripheral thrombus in the proximal SMA with mild luminal narrowing.  LYMPH NODES: No lymphadenopathy.  ABDOMINAL WALL: Within normal limits.  BONES: Degenerative changes of the spine.    IMPRESSION:  No aortic dissection or aneurysm. 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 Slight infiltrative changes of the fat adjacent to the celiac axis. Recommend vascular consult.    TTE results:  CONCLUSIONS:   1. Left ventricular systolic function is normal with an ejection fraction of 63 % by Calixto's method of disks.   2. There is normal LV mass and concentric remodeling.   3. Normal left ventricular diastolic function.   4. Normal right ventricular cavity size and normal right ventricular systolic function.   5. Trace mitral regurgitation.   6. Structurally normal mitral valve with normal leaflet excursion.   7. No evidence of aortic regurgitation.   8. Trileaflet aortic valve with normal systolic excursion.   9. Left atrium is normal in size.      Serial troponins negative. Cardiology advised starting losartan-HCTZ 50/12.5qd. Patient was started on HCTZ 12.5qd. Cardiology recommended outpatient echo and continued medication titration.     PT evaluated the patient no skilled PT needs.         ---  CONSULTANTS:   Cardio - Juan Antonio Sanderson  Vascular - Marta Aguero    ---  ADVANCED CARE PLANNING:  - Code status:      - MOLST completed:      [  ] NO     [  ] YES    ---  TIME SPENT:  I, the attending physician, was physically present for the key portions of the evaluation and management (E/M) service provided. The total amount of time spent reviewing the hospital notes, laboratory values, imaging findings, assessing/counseling the patient, discussing with consultant physicians, social work, nursing staff was -- minutes FROM ADMISSION H+P:   HPI:  58 y/o m w/ no known PMH p/w abdominal pain symptoms that he felt was related to constipation.  Pt had not had bm in last few days.  He had some generalized abdominal discomfort, nonradiating, not relieved by anything, that he had not experienced before, prompting him to come to the ED.  He had no nausea or vomiting, and doesn't recall any major new dietary changes.      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  He was given rounds of IV hydralazine and IV labetalol. (25 Nov 2024 12:01)      ---  HOSPITAL COURSE/PERTINENT LABS/PROCEDURES PERFORMED/PENDING TESTS:  Patient was evaluated by cardiology and was started on losartan/ HCTZ 50mg/ 12.5mg. Serial troponins were negative and his echocardiogram was normal and patient's blood pressure remained controlled in the 150-170's. 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 He was deemed stable for discharge and with recommendation for outpatient follow up.    ---  PATIENT CONDITION:  - stable    ---  Vitals:  T(C): 36.5 (11-26-24 @ 11:00), Max: 37.3 (11-25-24 @ 21:45)  T(F): 97.7 (11-26-24 @ 11:00), Max: 99.2 (11-25-24 @ 21:45)  HR: 93 (11-26-24 @ 11:00) (73 - 93)  BP: 168/95 (11-26-24 @ 11:00) (151/88 - 174/87)  RR: 18 (11-26-24 @ 11:00) (17 - 20)    PHYSICAL EXAM ON DAY OF DISCHARGE:     PHYSICAL EXAM:    GENERAL: NAD, lying in bed comfortably  HEAD:  Atraumatic, Normocephalic  EYES: EOMI, PERRLA, conjunctiva and sclera clear  ENT: Moist mucous membranes  NECK: Supple, No JVD  CHEST/LUNG: Clear to auscultation bilaterally; No rales, rhonchi, wheezing, or rubs. Unlabored respirations  HEART: Regular rate and rhythm; No murmurs, rubs, or gallops  ABDOMEN: Bowel sounds present; Soft, Nontender, Nondistended   EXTREMITIES:  2+ Peripheral Pulses, brisk capillary refill. No clubbing, cyanosis, or edema  NERVOUS SYSTEM:  Alert & Oriented X3, speech clear. No deficits   MSK: FROM all 4 extremities, full and equal strength  SKIN: No rashes or lesions            PT evaluated the patient no skilled PT needs.         ---  CONSULTANTS:   Cardio - Juan Antonio Sanderson  Vascular - Marta Aguero    ---  ADVANCED CARE PLANNING:  - Code status:      - MOLST completed:      [  ] NO     [  ] YES    ---  TIME SPENT:  I, the attending physician, was physically present for the key portions of the evaluation and management (E/M) service provided. The total amount of time spent reviewing the hospital notes, laboratory values, imaging findings, assessing/counseling the patient, discussing with consultant physicians, social work, nursing staff was -- minutes FROM ADMISSION H+P:   HPI:  58 y/o m w/ no known PMH p/w abdominal pain symptoms that he felt was related to constipation.  Pt had not had bm in last few days.  He had some generalized abdominal discomfort, nonradiating, not relieved by anything, that he had not experienced before, prompting him to come to the ED.  He had no nausea or vomiting, and doesn't recall any major new dietary changes.      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  He was given rounds of IV hydralazine and IV labetalol. (25 Nov 2024 12:01)      ---  HOSPITAL COURSE/PERTINENT LABS/PROCEDURES PERFORMED/PENDING TESTS:  Patient was evaluated by cardiology and was started on losartan/ HCTZ 50mg/ 12.5mg. Serial troponins were negative and his echocardiogram was normal and patient's blood pressure remained controlled in the 150-170's. 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 He was deemed stable for discharge and with recommendation for outpatient follow up.    ---  PATIENT CONDITION:  - stable    ---  Vitals:  T(C): 36.5 (11-26-24 @ 11:00), Max: 37.3 (11-25-24 @ 21:45)  T(F): 97.7 (11-26-24 @ 11:00), Max: 99.2 (11-25-24 @ 21:45)  HR: 93 (11-26-24 @ 11:00) (73 - 93)  BP: 168/95 (11-26-24 @ 11:00) (151/88 - 174/87)  RR: 18 (11-26-24 @ 11:00) (17 - 20)    PHYSICAL EXAM ON DAY OF DISCHARGE:     PHYSICAL EXAM:    GENERAL: NAD, lying in bed comfortably  HEAD:  Atraumatic, Normocephalic  EYES: EOMI, PERRLA, conjunctiva and sclera clear  ENT: Moist mucous membranes  NECK: Supple, No JVD  CHEST/LUNG: Clear to auscultation bilaterally; No rales, rhonchi, wheezing, or rubs. Unlabored respirations  HEART: Regular rate and rhythm; No murmurs, rubs, or gallops  ABDOMEN: Bowel sounds present; Soft, Nontender, Nondistended   EXTREMITIES:  2+ Peripheral Pulses, brisk capillary refill. No clubbing, cyanosis, or edema  NERVOUS SYSTEM:  Alert & Oriented X3, speech clear. No deficits   MSK: FROM all 4 extremities, full and equal strength  SKIN: No rashes or lesions            PT evaluated the patient no skilled PT needs.         ---  CONSULTANTS:   Cardio - Juan Antonio Sanderson  Vascular - Marta Aguero    ---  TIME SPENT:  I, the attending physician, was physically present for the key portions of the evaluation and management (E/M) service provided. The total amount of time spent reviewing the hospital notes, laboratory values, imaging findings, assessing/counseling the patient, discussing with consultant physicians, social work, nursing staff was 57 minutes

## 2024-11-26 NOTE — DISCHARGE NOTE PROVIDER - ATTENDING DISCHARGE PHYSICAL EXAMINATION:
T(C): 36.5 (11-26-24 @ 11:00), Max: 37.3 (11-25-24 @ 21:45)  HR: 93 (11-26-24 @ 11:00) (73 - 93)  BP: 168/95 (11-26-24 @ 11:00) (156/78 - 174/87)  RR: 18 (11-26-24 @ 11:00) (18 - 20)  SpO2: 93% (11-26-24 @ 11:00) (93% - 97%)    GENERAL: patient appears well, no acute distress, appropriate, pleasant  EYES: sclera clear, no exudates  ENMT: oropharynx clear without erythema, no exudates, moist mucous membranes  NECK: supple, soft, no thyromegaly noted  LUNGS: good air entry bilaterally, clear to auscultation, symmetric breath sounds, no wheezing or rhonchi appreciated  HEART: soft S1/S2, regular rate and rhythm, no murmurs noted, no lower extremity edema  GASTROINTESTINAL: abdomen is soft, nontender, nondistended, normoactive bowel sounds, no palpable masses  MUSCULOSKELETAL: no clubbing or cyanosis, no obvious deformity  NEUROLOGIC: awake, alert, oriented x3, good muscle tone in 4 extremities  PSYCHIATRIC: mood is good, affect is congruent, linear and logical thought process    Patient seen and examined at bedside. No events overnight. BP better controlled. Discussed with cardio, echo was without concern. Will increase losartan to 100mg daily and continue HCTZ 12.5mg daily. Patient stable for dc home. Will update family on his own. Patient to follow up with cardio outpatient, discussed with CM and will provide information

## 2024-11-26 NOTE — PROGRESS NOTE ADULT - NS ATTEND AMEND GEN_ALL_CORE FT
58 y/o M PMH of kidney stones who was found to be in hypertensive urgency. Patient is not showing any laboratory or physical signs of end organ dysfunction    - BP systolic 180s-200s  - Patient not on any home medications; has not seen physician in years  - Initiated on Losartan and HCTZ  - Increase Losartan to 100mg QD  - Continue HCTZ  - TTE with normal BiV function and no significant valvular disease  - To follow up with Dr. Lechuga

## 2024-11-26 NOTE — PROGRESS NOTE ADULT - PROVIDER SPECIALTY LIST ADULT
[de-identified] : Today I had a lengthy discussion with the patient regarding their left ankle and right knee pain. I have addressed all the patient's concerns surrounding the pathology of their condition. I recommend the patient continue with a course of physical therapy for the left ankle and right knee  2-3 times a week for a total of 8-12 weeks. A prescription was given for the physical therapy today. I recommended that the patient continue to utilize an ASO brace.  I also offered the patient a custom brace and I placed a prescription in to meet with our orthotist.  The patient understood and verbally agreed to the treatment plan. All of their questions were answered and they were satisfied with the visit. The patient should call the office if they have any questions or experience worsening symptoms. I would like to see the patient back in the office in 2-3 months to reassess their condition. 				\par  Cardiology

## 2024-11-26 NOTE — CASE MANAGEMENT PROGRESS NOTE - NSCMPROGRESSNOTE_GEN_ALL_CORE
CM met with patient to discuss possible discharge disposition and home care choices. Discharge notice signed and copy given to patient. CM notified him that discharge is pending cardio recommendations. Patient stated his wife will transport him home. Patient verbalized understanding of the transition plan and is in agreement.  CM remains available throughout hospital stay.

## 2024-11-26 NOTE — PROGRESS NOTE ADULT - ASSESSMENT
60 y/o M PMH of kidney stones who was found to be in hypertensive urgency. Patient is not showing any laboratory or physical signs of end organ dysfunction    Hypertensive Emergency  - Presented with BP of 236/134 s/p Labetalol and Hydralazine IV.  Not on home BP med  - BP still elevated but markedly improved to systolic 150-170  - Continue HCTZ and increase Losartan to 100 mg daily    - No evidence of volume overload  - TTE normal    - No clear evidence of acute ischemia  - Troponins x 2  negative   - EKG: NSR    - CT C/A/P: No aortic dissection or aneurysm. 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  - Recommend to f/u with us as outpatient    Rubia Velazquez DNP, NP-C, AGACNP-C  Cardiology   Call TEAMS

## 2024-11-26 NOTE — DISCHARGE NOTE PROVIDER - CARE PROVIDERS DIRECT ADDRESSES
[email or web address]

## 2024-11-26 NOTE — DISCHARGE NOTE PROVIDER - NSDCCPCAREPLAN_GEN_ALL_CORE_FT
PRINCIPAL DISCHARGE DIAGNOSIS  Diagnosis: Hypertensive urgency  Assessment and Plan of Treatment: You came to the hospital and you had very high blood pressure.   You were seen and evaluated by cardiology.   Treatment of hypertension usually begins with lifestyle changes, such as reducing the amount of salt in your diet, losing weight if you are overweight or obese, avoiding drinking too much alcohol, stopping smoking and exercising at least 30 minutes per day most days of the week. Untreated high blood pressure increases the strain on the heart and arteries, eventually causing organ damage. High blood pressure also increases the risk of heart failure, heart attack (myocardial infarction), stroke, and kidney failure.   Continue to take __________to prevent the possible complications of uncontrolled hypertension.   Please also follow up with your primary care physician on a regular basis to make sure your blood pressure continues to be well controlled.  If you experience symptoms such as but not limited to: sudden onset blurry vision, nausea, vomiting, chest pain, shortness of breath, or palpitations, please seek medical attention.      SECONDARY DISCHARGE DIAGNOSES  Diagnosis: Abdominal pain  Assessment and Plan of Treatment: You came to the hospital with constipation and abdominal pain.   You were started on a bowel regimen with senna and miralax.     PRINCIPAL DISCHARGE DIAGNOSIS  Diagnosis: Hypertensive urgency  Assessment and Plan of Treatment: You came to the hospital and you had very high blood pressure.   You were seen and evaluated by cardiology.   Treatment of hypertension usually begins with lifestyle changes, such as reducing the amount of salt in your diet, losing weight if you are overweight or obese, avoiding drinking too much alcohol, stopping smoking and exercising at least 30 minutes per day most days of the week. Untreated high blood pressure increases the strain on the heart and arteries, eventually causing organ damage. High blood pressure also increases the risk of heart failure, heart attack (myocardial infarction), stroke, and kidney failure.   Continue to take _____ to prevent the possible complications of uncontrolled hypertension.   Please also follow up with your primary care physician on a regular basis to make sure your blood pressure continues to be well controlled.  If you experience symptoms such as but not limited to: sudden onset blurry vision, nausea, vomiting, chest pain, shortness of breath, or palpitations, please seek medical attention.      SECONDARY DISCHARGE DIAGNOSES  Diagnosis: Abdominal pain  Assessment and Plan of Treatment: You came to the hospital with constipation and abdominal pain.   You were started on a bowel regimen with senna and miralax.     PRINCIPAL DISCHARGE DIAGNOSIS  Diagnosis: Hypertensive urgency  Assessment and Plan of Treatment: You came to the hospital and you had very high blood pressure.   You were seen and evaluated by cardiology.   Treatment of hypertension usually begins with lifestyle changes, such as reducing the amount of salt in your diet, losing weight if you are overweight or obese, avoiding drinking too much alcohol, stopping smoking and exercising at least 30 minutes per day most days of the week. Untreated high blood pressure increases the strain on the heart and arteries, eventually causing organ damage. High blood pressure also increases the risk of heart failure, heart attack (myocardial infarction), stroke, and kidney failure.   Continue to take losartan-HCTZ to prevent the possible complications of uncontrolled hypertension.   Please also follow up with your primary care physician on a regular basis to make sure your blood pressure continues to be well controlled.  If you experience symptoms such as but not limited to: sudden onset blurry vision, nausea, vomiting, chest pain, shortness of breath, or palpitations, please seek medical attention.  Please follow up with Dr. Lechuga for cardiology regarding further cardiology workup.      SECONDARY DISCHARGE DIAGNOSES  Diagnosis: Abdominal pain  Assessment and Plan of Treatment: You came to the hospital with constipation and abdominal pain.   You were started on a bowel regimen with senna and miralax.     PRINCIPAL DISCHARGE DIAGNOSIS  Diagnosis: Hypertensive urgency  Assessment and Plan of Treatment: You came to the hospital and you had very high blood pressure.   You were seen and evaluated by cardiology.   Treatment of hypertension usually begins with lifestyle changes, such as reducing the amount of salt in your diet, losing weight if you are overweight or obese, avoiding drinking too much alcohol, stopping smoking and exercising at least 30 minutes per day most days of the week. Untreated high blood pressure increases the strain on the heart and arteries, eventually causing organ damage. High blood pressure also increases the risk of heart failure, heart attack (myocardial infarction), stroke, and kidney failure.   START Hyzaar 100mg-12.5mg daily at home  START aspirin 81mg daily  Please also follow up with your primary care physician on a regular basis to make sure your blood pressure continues to be well controlled.  If you experience symptoms such as but not limited to: sudden onset blurry vision, nausea, vomiting, chest pain, shortness of breath, or palpitations, please seek medical attention.  Please follow up with Dr. Lechuga for cardiology regarding further cardiology workup.      SECONDARY DISCHARGE DIAGNOSES  Diagnosis: Abdominal pain  Assessment and Plan of Treatment: You came to the hospital with constipation and abdominal pain.   You were started on a bowel regimen with senna and miralax. 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 No acute intervention needed.   START aspirin 81mg daily  make sure to have blood pressure controlled   follow up outpatient

## 2024-11-26 NOTE — DISCHARGE NOTE PROVIDER - CARE PROVIDER_API CALL
Chichi Mott.  Family Medicine  [address], [address]  [place] [address]  Phone: [phone]  Fax: [phone]  Follow Up Time:    Chichi Mott.  Family Medicine  [address], [address]  [place] [address]  Phone: [phone]  Fax: [phone]  Follow Up Time:     Juan Antonio Lechuga  Cardiology  [address] [place] [address]  Phone: [phone]  Fax: [phone]  Follow Up Time:

## 2024-11-27 ENCOUNTER — NON-APPOINTMENT (OUTPATIENT)
Age: 59
End: 2024-11-27

## 2024-11-27 ENCOUNTER — APPOINTMENT (OUTPATIENT)
Dept: CARDIOLOGY | Facility: CLINIC | Age: 59
End: 2024-11-27
Payer: COMMERCIAL

## 2024-11-27 VITALS — SYSTOLIC BLOOD PRESSURE: 150 MMHG | DIASTOLIC BLOOD PRESSURE: 100 MMHG

## 2024-11-27 VITALS
SYSTOLIC BLOOD PRESSURE: 161 MMHG | HEART RATE: 94 BPM | BODY MASS INDEX: 29.77 KG/M2 | OXYGEN SATURATION: 96 % | HEIGHT: 74 IN | DIASTOLIC BLOOD PRESSURE: 113 MMHG | WEIGHT: 232 LBS

## 2024-11-27 DIAGNOSIS — I10 ESSENTIAL (PRIMARY) HYPERTENSION: ICD-10-CM

## 2024-11-27 DIAGNOSIS — I72.8 ANEURYSM OF OTHER SPECIFIED ARTERIES: ICD-10-CM

## 2024-11-27 LAB
CHOLEST SERPL-MCNC: 172 MG/DL — SIGNIFICANT CHANGE UP
HDLC SERPL-MCNC: 37 MG/DL — LOW
LIPID PNL WITH DIRECT LDL SERPL: 113 MG/DL — HIGH
NON HDL CHOLESTEROL: 135 MG/DL — HIGH
TRIGL SERPL-MCNC: 123 MG/DL — SIGNIFICANT CHANGE UP

## 2024-11-27 PROCEDURE — G2211 COMPLEX E/M VISIT ADD ON: CPT | Mod: NC

## 2024-11-27 PROCEDURE — 99215 OFFICE O/P EST HI 40 MIN: CPT

## 2024-11-27 PROCEDURE — 93000 ELECTROCARDIOGRAM COMPLETE: CPT

## 2024-11-27 RX ORDER — AMLODIPINE BESYLATE 5 MG/1
5 TABLET ORAL DAILY
Qty: 30 | Refills: 2 | Status: ACTIVE | COMMUNITY
Start: 2024-11-27 | End: 1900-01-01

## 2024-11-27 RX ORDER — LOSARTAN POTASSIUM AND HYDROCHLOROTHIAZIDE 100; 12.5 MG/1; MG/1
100-12.5 TABLET, FILM COATED ORAL
Refills: 0 | Status: ACTIVE | COMMUNITY

## 2024-11-27 RX ORDER — ASPIRIN 81 MG
81 TABLET, DELAYED RELEASE (ENTERIC COATED) ORAL
Refills: 0 | Status: ACTIVE | COMMUNITY

## 2024-12-18 ENCOUNTER — NON-APPOINTMENT (OUTPATIENT)
Age: 59
End: 2024-12-18

## 2024-12-18 ENCOUNTER — APPOINTMENT (OUTPATIENT)
Dept: CARDIOLOGY | Facility: CLINIC | Age: 59
End: 2024-12-18
Payer: COMMERCIAL

## 2024-12-18 VITALS
BODY MASS INDEX: 29.52 KG/M2 | WEIGHT: 230 LBS | OXYGEN SATURATION: 96 % | HEIGHT: 74 IN | HEART RATE: 76 BPM | DIASTOLIC BLOOD PRESSURE: 99 MMHG | SYSTOLIC BLOOD PRESSURE: 163 MMHG

## 2024-12-18 DIAGNOSIS — I10 ESSENTIAL (PRIMARY) HYPERTENSION: ICD-10-CM

## 2024-12-18 PROCEDURE — G2211 COMPLEX E/M VISIT ADD ON: CPT | Mod: NC

## 2024-12-18 PROCEDURE — 93000 ELECTROCARDIOGRAM COMPLETE: CPT

## 2024-12-18 PROCEDURE — 99214 OFFICE O/P EST MOD 30 MIN: CPT

## 2025-03-04 ENCOUNTER — APPOINTMENT (OUTPATIENT)
Dept: CARDIOLOGY | Facility: CLINIC | Age: 60
End: 2025-03-04
Payer: COMMERCIAL

## 2025-03-04 ENCOUNTER — NON-APPOINTMENT (OUTPATIENT)
Age: 60
End: 2025-03-04

## 2025-03-04 VITALS
SYSTOLIC BLOOD PRESSURE: 157 MMHG | BODY MASS INDEX: 28.88 KG/M2 | WEIGHT: 225 LBS | DIASTOLIC BLOOD PRESSURE: 99 MMHG | HEART RATE: 68 BPM | HEIGHT: 74 IN | OXYGEN SATURATION: 99 %

## 2025-03-04 DIAGNOSIS — I10 ESSENTIAL (PRIMARY) HYPERTENSION: ICD-10-CM

## 2025-03-04 PROCEDURE — G2211 COMPLEX E/M VISIT ADD ON: CPT | Mod: NC

## 2025-03-04 PROCEDURE — 93000 ELECTROCARDIOGRAM COMPLETE: CPT

## 2025-03-04 PROCEDURE — 99214 OFFICE O/P EST MOD 30 MIN: CPT

## 2025-03-04 RX ORDER — HYDRALAZINE HYDROCHLORIDE 25 MG/1
25 TABLET ORAL TWICE DAILY
Qty: 60 | Refills: 2 | Status: ACTIVE | COMMUNITY
Start: 2025-03-04 | End: 1900-01-01

## 2025-03-20 ENCOUNTER — NON-APPOINTMENT (OUTPATIENT)
Age: 60
End: 2025-03-20

## 2025-04-10 ENCOUNTER — APPOINTMENT (OUTPATIENT)
Dept: INTERNAL MEDICINE | Facility: CLINIC | Age: 60
End: 2025-04-10
Payer: COMMERCIAL

## 2025-04-10 VITALS
DIASTOLIC BLOOD PRESSURE: 100 MMHG | WEIGHT: 223 LBS | TEMPERATURE: 98.4 F | HEIGHT: 74 IN | OXYGEN SATURATION: 99 % | HEART RATE: 92 BPM | SYSTOLIC BLOOD PRESSURE: 180 MMHG | BODY MASS INDEX: 28.62 KG/M2

## 2025-04-10 VITALS — DIASTOLIC BLOOD PRESSURE: 100 MMHG | SYSTOLIC BLOOD PRESSURE: 176 MMHG

## 2025-04-10 DIAGNOSIS — I72.8 ANEURYSM OF OTHER SPECIFIED ARTERIES: ICD-10-CM

## 2025-04-10 DIAGNOSIS — Z82.49 FAMILY HISTORY OF ISCHEMIC HEART DISEASE AND OTHER DISEASES OF THE CIRCULATORY SYSTEM: ICD-10-CM

## 2025-04-10 DIAGNOSIS — Z78.9 OTHER SPECIFIED HEALTH STATUS: ICD-10-CM

## 2025-04-10 DIAGNOSIS — E66.3 OVERWEIGHT: ICD-10-CM

## 2025-04-10 DIAGNOSIS — I10 ESSENTIAL (PRIMARY) HYPERTENSION: ICD-10-CM

## 2025-04-10 DIAGNOSIS — R14.0 ABDOMINAL DISTENSION (GASEOUS): ICD-10-CM

## 2025-04-10 DIAGNOSIS — Z87.442 PERSONAL HISTORY OF URINARY CALCULI: ICD-10-CM

## 2025-04-10 PROCEDURE — G2211 COMPLEX E/M VISIT ADD ON: CPT | Mod: NC

## 2025-04-10 PROCEDURE — 99204 OFFICE O/P NEW MOD 45 MIN: CPT

## 2025-04-10 PROCEDURE — 99203 OFFICE O/P NEW LOW 30 MIN: CPT

## 2025-04-11 ENCOUNTER — TRANSCRIPTION ENCOUNTER (OUTPATIENT)
Age: 60
End: 2025-04-11

## 2025-05-06 ENCOUNTER — APPOINTMENT (OUTPATIENT)
Dept: CARDIOLOGY | Facility: CLINIC | Age: 60
End: 2025-05-06
Payer: COMMERCIAL

## 2025-05-06 ENCOUNTER — NON-APPOINTMENT (OUTPATIENT)
Age: 60
End: 2025-05-06

## 2025-05-06 VITALS
HEART RATE: 76 BPM | OXYGEN SATURATION: 100 % | SYSTOLIC BLOOD PRESSURE: 160 MMHG | BODY MASS INDEX: 29 KG/M2 | WEIGHT: 226 LBS | HEIGHT: 74 IN | DIASTOLIC BLOOD PRESSURE: 92 MMHG

## 2025-05-06 VITALS — DIASTOLIC BLOOD PRESSURE: 94 MMHG | SYSTOLIC BLOOD PRESSURE: 150 MMHG

## 2025-05-06 DIAGNOSIS — I10 ESSENTIAL (PRIMARY) HYPERTENSION: ICD-10-CM

## 2025-05-06 PROCEDURE — 99214 OFFICE O/P EST MOD 30 MIN: CPT

## 2025-05-06 PROCEDURE — 93000 ELECTROCARDIOGRAM COMPLETE: CPT

## 2025-05-06 PROCEDURE — G2211 COMPLEX E/M VISIT ADD ON: CPT | Mod: NC

## 2025-05-07 ENCOUNTER — APPOINTMENT (OUTPATIENT)
Dept: CARDIOLOGY | Facility: CLINIC | Age: 60
End: 2025-05-07
Payer: COMMERCIAL

## 2025-05-07 PROCEDURE — 93790 AMBL BP MNTR W/SW I&R: CPT

## 2025-07-10 ENCOUNTER — APPOINTMENT (OUTPATIENT)
Dept: INTERNAL MEDICINE | Facility: CLINIC | Age: 60
End: 2025-07-10

## 2025-08-13 ENCOUNTER — APPOINTMENT (OUTPATIENT)
Dept: INTERNAL MEDICINE | Facility: CLINIC | Age: 60
End: 2025-08-13
Payer: COMMERCIAL

## 2025-08-13 VITALS — SYSTOLIC BLOOD PRESSURE: 144 MMHG | DIASTOLIC BLOOD PRESSURE: 88 MMHG

## 2025-08-13 VITALS
WEIGHT: 219 LBS | HEART RATE: 76 BPM | BODY MASS INDEX: 28.11 KG/M2 | OXYGEN SATURATION: 98 % | HEIGHT: 74 IN | RESPIRATION RATE: 16 BRPM | SYSTOLIC BLOOD PRESSURE: 158 MMHG | DIASTOLIC BLOOD PRESSURE: 96 MMHG | TEMPERATURE: 98.4 F

## 2025-08-13 DIAGNOSIS — H61.23 IMPACTED CERUMEN, BILATERAL: ICD-10-CM

## 2025-08-13 DIAGNOSIS — I10 ESSENTIAL (PRIMARY) HYPERTENSION: ICD-10-CM

## 2025-08-13 DIAGNOSIS — E66.3 OVERWEIGHT: ICD-10-CM

## 2025-08-13 DIAGNOSIS — Z00.00 ENCOUNTER FOR GENERAL ADULT MEDICAL EXAMINATION W/OUT ABNORMAL FINDINGS: ICD-10-CM

## 2025-08-13 DIAGNOSIS — Z12.11 ENCOUNTER FOR SCREENING FOR MALIGNANT NEOPLASM OF COLON: ICD-10-CM

## 2025-08-13 PROCEDURE — 36415 COLL VENOUS BLD VENIPUNCTURE: CPT

## 2025-08-13 PROCEDURE — 99213 OFFICE O/P EST LOW 20 MIN: CPT | Mod: 25

## 2025-08-13 PROCEDURE — 99396 PREV VISIT EST AGE 40-64: CPT

## 2025-08-14 LAB
ALBUMIN SERPL ELPH-MCNC: 4.8 G/DL
ALP BLD-CCNC: 62 U/L
ALT SERPL-CCNC: 32 U/L
ANION GAP SERPL CALC-SCNC: 15 MMOL/L
APPEARANCE: CLEAR
AST SERPL-CCNC: 23 U/L
BACTERIA: NEGATIVE /HPF
BASOPHILS # BLD AUTO: 0.03 K/UL
BASOPHILS NFR BLD AUTO: 0.5 %
BILIRUB SERPL-MCNC: 0.5 MG/DL
BILIRUBIN URINE: NEGATIVE
BLOOD URINE: NEGATIVE
BUN SERPL-MCNC: 15 MG/DL
CALCIUM SERPL-MCNC: 10 MG/DL
CAST: 0 /LPF
CHLORIDE SERPL-SCNC: 99 MMOL/L
CHOLEST SERPL-MCNC: 164 MG/DL
CO2 SERPL-SCNC: 26 MMOL/L
COLOR: YELLOW
CREAT SERPL-MCNC: 0.78 MG/DL
EGFRCR SERPLBLD CKD-EPI 2021: 101 ML/MIN/1.73M2
EOSINOPHIL # BLD AUTO: 0.04 K/UL
EOSINOPHIL NFR BLD AUTO: 0.6 %
EPITHELIAL CELLS: 0 /HPF
ESTIMATED AVERAGE GLUCOSE: 103 MG/DL
GLUCOSE QUALITATIVE U: NEGATIVE MG/DL
GLUCOSE SERPL-MCNC: 100 MG/DL
HBA1C MFR BLD HPLC: 5.2 %
HCT VFR BLD CALC: 45.4 %
HDLC SERPL-MCNC: 44 MG/DL
HGB BLD-MCNC: 14.8 G/DL
IMM GRANULOCYTES NFR BLD AUTO: 0.2 %
KETONES URINE: NEGATIVE MG/DL
LDLC SERPL-MCNC: 106 MG/DL
LEUKOCYTE ESTERASE URINE: NEGATIVE
LYMPHOCYTES # BLD AUTO: 1.64 K/UL
LYMPHOCYTES NFR BLD AUTO: 25.2 %
MAN DIFF?: NORMAL
MCHC RBC-ENTMCNC: 29 PG
MCHC RBC-ENTMCNC: 32.6 G/DL
MCV RBC AUTO: 89 FL
MICROSCOPIC-UA: NORMAL
MONOCYTES # BLD AUTO: 0.5 K/UL
MONOCYTES NFR BLD AUTO: 7.7 %
NEUTROPHILS # BLD AUTO: 4.28 K/UL
NEUTROPHILS NFR BLD AUTO: 65.8 %
NITRITE URINE: NEGATIVE
NONHDLC SERPL-MCNC: 120 MG/DL
PH URINE: 5.5
PLATELET # BLD AUTO: 252 K/UL
POTASSIUM SERPL-SCNC: 4 MMOL/L
PROT SERPL-MCNC: 7.4 G/DL
PROTEIN URINE: NEGATIVE MG/DL
PSA SERPL-MCNC: 1.13 NG/ML
RBC # BLD: 5.1 M/UL
RBC # FLD: 13.7 %
RED BLOOD CELLS URINE: 0 /HPF
SODIUM SERPL-SCNC: 140 MMOL/L
SPECIFIC GRAVITY URINE: 1.01
TRIGL SERPL-MCNC: 75 MG/DL
TSH SERPL-ACNC: 1.63 UIU/ML
UROBILINOGEN URINE: 0.2 MG/DL
VIT B12 SERPL-MCNC: 522 PG/ML
WBC # FLD AUTO: 6.5 K/UL
WHITE BLOOD CELLS URINE: 0 /HPF

## 2025-08-27 ENCOUNTER — OFFICE (OUTPATIENT)
Dept: URBAN - METROPOLITAN AREA CLINIC 109 | Facility: CLINIC | Age: 60
Setting detail: OPHTHALMOLOGY
End: 2025-08-27
Payer: COMMERCIAL

## 2025-08-27 DIAGNOSIS — H43.393: ICD-10-CM

## 2025-08-27 DIAGNOSIS — H25.13: ICD-10-CM

## 2025-08-27 DIAGNOSIS — H00.015: ICD-10-CM

## 2025-08-27 DIAGNOSIS — H16.223: ICD-10-CM

## 2025-08-27 PROCEDURE — 92250 FUNDUS PHOTOGRAPHY W/I&R: CPT | Performed by: OPHTHALMOLOGY

## 2025-08-27 PROCEDURE — 92014 COMPRE OPH EXAM EST PT 1/>: CPT | Performed by: OPHTHALMOLOGY

## 2025-08-27 ASSESSMENT — CONFRONTATIONAL VISUAL FIELD TEST (CVF)
OD_FINDINGS: FULL
OS_FINDINGS: FULL

## 2025-08-27 ASSESSMENT — SUPERFICIAL PUNCTATE KERATITIS (SPK)
OD_SPK: 1+
OS_SPK: 1+

## 2025-08-27 ASSESSMENT — REFRACTION_AUTOREFRACTION
OD_AXIS: 054
OS_CYLINDER: -0.25
OS_SPHERE: +0.25
OS_AXIS: 118
OD_SPHERE: +0.25
OD_CYLINDER: -0.25

## 2025-08-27 ASSESSMENT — VISUAL ACUITY
OS_BCVA: 20/20
OD_BCVA: 20/25

## 2025-08-27 ASSESSMENT — TONOMETRY
OD_IOP_MMHG: 17
OS_IOP_MMHG: 16
OD_IOP_MMHG: 13
OS_IOP_MMHG: 16